# Patient Record
Sex: FEMALE | Race: WHITE | Employment: OTHER | URBAN - METROPOLITAN AREA
[De-identification: names, ages, dates, MRNs, and addresses within clinical notes are randomized per-mention and may not be internally consistent; named-entity substitution may affect disease eponyms.]

---

## 2017-01-25 PROBLEM — Z78.9 STATIN INTOLERANCE: Status: ACTIVE | Noted: 2017-01-25

## 2017-03-06 RX ORDER — METFORMIN HYDROCHLORIDE 500 MG/1
500 TABLET, EXTENDED RELEASE ORAL DAILY
Qty: 90 TAB | Refills: 1 | Status: SHIPPED | OUTPATIENT
Start: 2017-03-06 | End: 2017-07-31 | Stop reason: SDUPTHER

## 2017-03-06 RX ORDER — ALBUTEROL SULFATE 90 UG/1
2 AEROSOL, METERED RESPIRATORY (INHALATION)
Qty: 1 INHALER | Refills: 3 | Status: SHIPPED | OUTPATIENT
Start: 2017-03-06 | End: 2017-06-16 | Stop reason: SDDI

## 2017-04-05 RX ORDER — IBUPROFEN 800 MG/1
800 TABLET ORAL
Qty: 90 TAB | Refills: 3 | Status: SHIPPED | OUTPATIENT
Start: 2017-04-05 | End: 2017-06-16 | Stop reason: SDDI

## 2017-06-07 DIAGNOSIS — E55.9 VITAMIN D DEFICIENCY: ICD-10-CM

## 2017-06-07 DIAGNOSIS — E78.2 MIXED HYPERCHOLESTEROLEMIA AND HYPERTRIGLYCERIDEMIA: ICD-10-CM

## 2017-06-07 DIAGNOSIS — E11.9 CONTROLLED TYPE 2 DIABETES MELLITUS WITHOUT COMPLICATION, WITHOUT LONG-TERM CURRENT USE OF INSULIN (HCC): Primary | ICD-10-CM

## 2017-06-09 PROBLEM — E11.29 TYPE 2 DIABETES MELLITUS WITH MICROALBUMINURIA, WITHOUT LONG-TERM CURRENT USE OF INSULIN (HCC): Status: ACTIVE | Noted: 2017-06-09

## 2017-06-09 PROBLEM — R80.9 TYPE 2 DIABETES MELLITUS WITH MICROALBUMINURIA, WITHOUT LONG-TERM CURRENT USE OF INSULIN (HCC): Status: ACTIVE | Noted: 2017-06-09

## 2017-06-09 LAB
25(OH)D3 SERPL-MCNC: 18.5 NG/ML (ref 32–100)
A-G RATIO,AGRAT: 1.5 RATIO (ref 1.1–2.6)
ALBUMIN SERPL-MCNC: 4.3 G/DL (ref 3.5–5)
ALP SERPL-CCNC: 106 U/L (ref 25–115)
ALT SERPL-CCNC: 37 U/L (ref 5–40)
ANION GAP SERPL CALC-SCNC: 18 MMOL/L
AST SERPL W P-5'-P-CCNC: 35 U/L (ref 10–37)
AVG GLU, 10930: 144 MG/DL (ref 91–123)
BILIRUB SERPL-MCNC: <0.1 MG/DL (ref 0.2–1.2)
BUN SERPL-MCNC: 8 MG/DL (ref 6–22)
CALCIUM SERPL-MCNC: 9.3 MG/DL (ref 8.4–10.5)
CHLORIDE SERPL-SCNC: 94 MMOL/L (ref 98–110)
CHOLEST SERPL-MCNC: 181 MG/DL (ref 110–200)
CO2 SERPL-SCNC: 25 MMOL/L (ref 20–32)
CREAT SERPL-MCNC: 0.6 MG/DL (ref 0.5–1.2)
CREATININE, URINE: 41 MG/DL
ERYTHROCYTE [DISTWIDTH] IN BLOOD BY AUTOMATED COUNT: 15.5 % (ref 10–16)
GFRAA, 66117: >60
GFRNA, 66118: >60
GLOBULIN,GLOB: 2.8 G/DL (ref 2–4)
GLUCOSE SERPL-MCNC: 134 MG/DL (ref 65–99)
HBA1C MFR BLD HPLC: 6.7 % (ref 4.8–5.9)
HCT VFR BLD AUTO: 49.4 % (ref 35.1–48)
HDLC SERPL-MCNC: 30 MG/DL (ref 40–59)
HGB BLD-MCNC: 15.1 G/DL (ref 11.7–16)
LDLC SERPL CALC-MCNC: 84 MG/DL (ref 50–99)
MCH RBC QN AUTO: 28 PG (ref 26–34)
MCHC RBC AUTO-ENTMCNC: 31 G/DL (ref 32–36)
MCV RBC AUTO: 92 FL (ref 80–95)
MICROALB/CREAT RATIO, 140286: 37.1 MCG/MG OF CREATININE (ref 0–30)
MICROALBUMIN,URINE RANDOM 140054: 15.2 UG/ML (ref 0.1–17)
PLATELET # BLD AUTO: 282 K/UL (ref 140–440)
PMV BLD AUTO: 11.3 FL (ref 6–10.8)
POTASSIUM SERPL-SCNC: 4.4 MMOL/L (ref 3.5–5.5)
PROT SERPL-MCNC: 7.1 G/DL (ref 6.4–8.3)
RBC # BLD AUTO: 5.37 M/UL (ref 3.8–5.2)
SODIUM SERPL-SCNC: 137 MMOL/L (ref 133–145)
TRIGL SERPL-MCNC: 333 MG/DL (ref 40–149)
VLDLC SERPL CALC-MCNC: 67 MG/DL (ref 8–30)
WBC # BLD AUTO: 9.6 K/UL (ref 4–11)

## 2017-06-16 ENCOUNTER — OFFICE VISIT (OUTPATIENT)
Dept: FAMILY MEDICINE CLINIC | Age: 57
End: 2017-06-16

## 2017-06-16 VITALS
HEART RATE: 82 BPM | RESPIRATION RATE: 22 BRPM | DIASTOLIC BLOOD PRESSURE: 88 MMHG | OXYGEN SATURATION: 97 % | SYSTOLIC BLOOD PRESSURE: 132 MMHG | HEIGHT: 64 IN | TEMPERATURE: 97.9 F | BODY MASS INDEX: 40.46 KG/M2 | WEIGHT: 237 LBS

## 2017-06-16 DIAGNOSIS — R80.9 TYPE 2 DIABETES MELLITUS WITH MICROALBUMINURIA, WITHOUT LONG-TERM CURRENT USE OF INSULIN (HCC): Primary | ICD-10-CM

## 2017-06-16 DIAGNOSIS — E78.2 MIXED HYPERCHOLESTEROLEMIA AND HYPERTRIGLYCERIDEMIA: ICD-10-CM

## 2017-06-16 DIAGNOSIS — E11.29 TYPE 2 DIABETES MELLITUS WITH MICROALBUMINURIA, WITHOUT LONG-TERM CURRENT USE OF INSULIN (HCC): Primary | ICD-10-CM

## 2017-06-16 DIAGNOSIS — Z78.0 POSTMENOPAUSAL: ICD-10-CM

## 2017-06-16 DIAGNOSIS — M85.80 OSTEOPENIA, UNSPECIFIED LOCATION: ICD-10-CM

## 2017-06-16 DIAGNOSIS — Z12.39 SCREENING FOR BREAST CANCER: ICD-10-CM

## 2017-06-16 DIAGNOSIS — Z12.11 SCREEN FOR COLON CANCER: ICD-10-CM

## 2017-06-16 NOTE — PROGRESS NOTES
cAacia Maldonado is a 61 y.o. female  Patient here for follow up for diabetes. 1. Have you been to the ER, urgent care clinic since your last visit? Hospitalized since your last visit? No    2. Have you seen or consulted any other health care providers outside of the [address] [place] [address] since your last visit? Include any pap smears or colon screening.  Yes Where: [facility]

## 2017-06-16 NOTE — PROGRESS NOTES
Assessment/Plan:    1. Type 2 diabetes mellitus with microalbuminuria, without long-term current use of insulin (HCC)  -A1c 6.7. F/u in 6 mos  -  DIABETES FOOT EXAM    2. Mixed hypercholesterolemia and hypertriglyceridemia  -intol of statin  Lipid lowering therapy not prescibed for patient reasons. 3. Osteopenia, unspecified location  -pt to restart Calcium +D  - DEXA BONE DENSITY STUDY AXIAL; Future    4. Screening for breast cancer  - [facility] MAMMO BI SCREENING INCL CAD; Future    5. Screen for colon cancer  - OCCULT BLOOD, STOOL; Future    The plan was discussed with the patient. The patient verbalized understanding and is in agreement with the plan. All medication potential side effects were discussed with the patient. Health Maintenance:   Health Maintenance   Topic Date Due    Hepatitis C Screening  1960    Pneumococcal 19-64 Medium Risk (1 of 1 - PPSV23) 01/16/1978    BREAST CANCER SCRN MAMMOGRAM  09/26/2016    COLON CANCER SCRN (BARIUM / CT COLO / FLX SIG) Q5  01/01/2017    FOOT EXAM Q1  06/07/2017    INFLUENZA AGE 9 TO ADULT  08/01/2017    EYE EXAM RETINAL OR DILATED Q1  11/02/2017    HEMOGLOBIN A1C Q6M  12/08/2017    MICROALBUMIN Q1  06/08/2018    LIPID PANEL Q1  06/08/2018    DTaP/Tdap/Td series (2 - Td) 12/09/2026       Daja Ramey is a 61 y.o. female and presents with Diabetes     Subjective:  DM2-  On metformin. A1c 6.7. Osteopenia- stoped taking calcium and D. Vit D is low. ROS:  Constitutional: No recent weight change. No weakness/fatigue. No f/c. Skin: No rashes, change in nails/hair, itching   HENT: No HA, dizziness. No hearing loss/tinnitus. No nasal congestion/discharge. Eyes: No change in vision, double/blurred vision or eye pain/redness. Cardiovascular: No CP/palpitations. No PEREZ/orthopnea/PND. Respiratory: No cough/sputum, dyspnea, wheezing. Gastointestinal: No dysphagia, reflux. No n/v. No constipation/diarrhea.   No melena/rectal bleeding. Genitourinary: No dysuria, urinary hesitancy, nocturia, hematuria. No incontinence. Musculoskeletal: No joint pain/stiffness. No muscle pain/tenderness. Endo: No heat/cold intolerance, no polyuria/polydypsia. Heme: No h/o anemia. No easy bleeding/bruising. Allergy/Immunology: No seasonal rhinitis. Denies frequent colds, sinus/ear infections. Neurological: No seizures/numbness/weakness. No paresthesias. Psychiatric:  No depression, anxiety. The problem list was updated as a part of today's visit. Patient Active Problem List   Diagnosis Code    Obesity (BMI 30-39. 9) E66.9    Allergy-induced asthma J45.909    Tobacco use Z72.0    Vitamin D deficiency E55.9    Sciatic pain M54.30    Mixed hypercholesterolemia and hypertriglyceridemia E78.2    Seasonal affective disorder (HCC) F39    Osteopenia M85.80    Statin intolerance Z78.9    Type 2 diabetes mellitus with microalbuminuria, without long-term current use of insulin (HCC) E11.29, R80.9       The PSH, FH were reviewed. SH:  Social History   Substance Use Topics    Smoking status: Heavy Tobacco Smoker     Packs/day: 1.00    Smokeless tobacco: Never Used    Alcohol use No       Medications/Allergies:  Current Outpatient Prescriptions on File Prior to Visit   Medication Sig Dispense Refill    metFORMIN ER (GLUCOPHAGE XR) 500 mg tablet Take 1 Tab by mouth daily. Indications: type 2 diabetes mellitus 90 Tab 1    lisinopril (PRINIVIL, ZESTRIL) 5 mg tablet TAKE ONE TABLET BY MOUTH ONCE DAILY 90 Tab 1    calcium-cholecalciferol, d3, (CALCIUM+D) 400-133.3 mg-unit tab Take  by mouth.  alendronate (FOSAMAX) 70 mg tablet Take 1 Tab by mouth every Sunday. 12 Tab 4     No current facility-administered medications on file prior to visit.          Allergies   Allergen Reactions    Keflex [Cephalexin] Hives    Percocet [Oxycodone-Acetaminophen] Hives       Objective:  Visit Vitals    /88    Pulse 82    Temp 97.9 °F (36.6 °C) (Oral)    Resp 22    Ht 5' 4\" (1.626 m)    Wt 237 lb (107.5 kg)    SpO2 97%    BMI 40.68 kg/m2      Constitutional: Well developed, nourished, no distress, alert, obese habitus   CV: S1, S2.  RRR. No murmurs/rubs. No thrills palpated. No carotid bruits. Intact distal pulses. No edema. Pulm: No abnormalities on inspection. Clear to auscultation bilaterally. No wheezing/rhonchi. Normal effort. GI: Soft, nontender, nondistended. Normal active bowel sounds. Diabetic foot exam:     bilat:   Filament test normal sensation with micro filament     Deformities: Yes - bunion      Labwork and Ancillary Studies:    CBC w/Diff  Lab Results   Component Value Date/Time    WBC 9.6 06/08/2017 07:32 AM    HGB 15.1 06/08/2017 07:32 AM    PLATELET 874 32/71/8961 07:32 AM         Basic Metabolic Profile/LFTs  Lab Results   Component Value Date/Time    Sodium 137 06/08/2017 07:32 AM    Potassium 4.4 06/08/2017 07:32 AM    Chloride 94 06/08/2017 07:32 AM    CO2 25 06/08/2017 07:32 AM    Anion gap 18.0 06/08/2017 07:32 AM    Glucose 134 06/08/2017 07:32 AM    BUN 8 06/08/2017 07:32 AM    Creatinine 0.6 06/08/2017 07:32 AM    BUN/Creatinine ratio 12 06/01/2016 07:25 AM    GFR est AA >60 06/01/2016 07:25 AM    GFR est non-AA >60 06/01/2016 07:25 AM    Calcium 9.3 06/08/2017 07:32 AM      Lab Results   Component Value Date/Time    ALT (SGPT) 37 06/08/2017 07:32 AM    AST (SGOT) 35 06/08/2017 07:32 AM    Alk.  phosphatase 106 06/08/2017 07:32 AM    Bilirubin, total <0.1 06/08/2017 07:32 AM       Cholesterol  Lab Results   Component Value Date/Time    Cholesterol, total 181 06/08/2017 07:32 AM    HDL Cholesterol 30 06/08/2017 07:32 AM    LDL, calculated 84 06/08/2017 07:32 AM    Triglyceride 333 06/08/2017 07:32 AM    CHOL/HDL Ratio 4.4 12/06/2016 07:48 AM

## 2017-06-16 NOTE — MR AVS SNAPSHOT
Visit Information Date & Time Provider Department Dept. Phone Encounter #  
 6/16/2017  7:30 AM Nini Pradhan [place] [phone] [id number] Upcoming Health Maintenance Date Due Hepatitis C Screening 1960 Pneumococcal 19-64 Medium Risk (1 of 1 - PPSV23) 1/16/1978 BREAST CANCER SCRN MAMMOGRAM 9/26/2016 COLON CANCER SCRN (BARIUM / CT COLO / FLX SIG) Q5 1/1/2017 INFLUENZA AGE 9 TO ADULT 8/1/2017 EYE EXAM RETINAL OR DILATED Q1 11/2/2017 HEMOGLOBIN A1C Q6M 12/8/2017 MICROALBUMIN Q1 6/8/2018 LIPID PANEL Q1 6/8/2018 FOOT EXAM Q1 6/16/2018 DTaP/Tdap/Td series (2 - Td) 12/9/2026 Allergies as of 6/16/2017  Review Complete On: 6/16/2017 By: Anup Fox LPN Severity Noted Reaction Type Reactions Keflex [Cephalexin]  03/31/2015    Hives Percocet [Oxycodone-acetaminophen]  03/31/2015    Hives Current Immunizations  Never Reviewed Name Date Influenza Vaccine 10/2/2015  8:39 AM  
 Influenza Vaccine (Quad) PF 12/9/2016 Tdap 12/9/2016 Not reviewed this visit You Were Diagnosed With   
  
 Codes Comments Type 2 diabetes mellitus with microalbuminuria, without long-term current use of insulin (HCC)    -  Primary ICD-10-CM: E11.29, R80.9 ICD-9-CM: 250.40, 791.0 Mixed hypercholesterolemia and hypertriglyceridemia     ICD-10-CM: E78.2 ICD-9-CM: 272.2 Osteopenia, unspecified location     ICD-10-CM: M85.80 ICD-9-CM: 733.90 Screening for breast cancer     ICD-10-CM: Z12.39 
ICD-9-CM: V76.10 Screen for colon cancer     ICD-10-CM: Z12.11 ICD-9-CM: V76.51 Postmenopausal     ICD-10-CM: Z78.0 ICD-9-CM: V49.81 Vitals BP Pulse Temp Resp Height(growth percentile) Weight(growth percentile) 132/88 82 97.9 °F (36.6 °C) (Oral) 22 5' 4\" (1.626 m) 237 lb (107.5 kg) SpO2 BMI OB Status Smoking Status 97% 40.68 kg/m2 Hysterectomy Heavy Tobacco Smoker BMI and BSA Data Body Mass Index Body Surface Area  
 40.68 kg/m 2 2.2 m 2 Preferred Pharmacy Pharmacy Name Phone [facility] [address] [phone] Your Updated Medication List  
  
   
This list is accurate as of: 6/16/17  7:57 AM.  Always use your most recent med list.  
  
  
  
  
 CALCIUM+D 400-133.3 mg-unit Tab Generic drug:  calcium-cholecalciferol (d3) Take  by mouth.  
  
 metFORMIN  mg tablet Commonly known as:  GLUCOPHAGE XR Take 1 Tab by mouth daily. Indications: type 2 diabetes mellitus We Performed the Following  DIABETES FOOT EXAM [7 Custom] To-Do List   
 06/16/2017 Imaging:  DEXA BONE DENSITY STUDY AXIAL   
  
 06/16/2017 Imaging:  DEXA BONE DENSITY STUDY AXIAL   
  
 06/16/2017 Imaging:  [facility] MAMMO BI SCREENING INCL CAD   
  
 06/16/2017 Lab:  OCCULT BLOOD, STOOL Referral Information Referral ID Referred By Referred To  
  
 [id number] Ardena Fleischer V Not Available Visits Status Start Date End Date 1 New Request 6/16/17 6/16/18 If your referral has a status of pending review or denied, additional information will be sent to support the outcome of this decision. Referral ID Referred By Referred To  
 [id number] Ardena Fleischer V Not Available Visits Status Start Date End Date 1 New Request 6/16/17 6/16/18 If your referral has a status of pending review or denied, additional information will be sent to support the outcome of this decision. Introducing [facility]! Dear Lamont Aguilar: Thank you for requesting a [employer] account. Our records indicate that you already have an active [employer] account. You can access your account anytime at [email or web address] Face [email or web address] Face [email or web address] Did you know that you can access your hospital and ER discharge instructions at any time in [employer]?   You can also review all of your test results from your hospital stay or ER visit. Additional Information If you have questions, please visit the Frequently Asked Questions section of the [employer] website at [email or web address] Remember, [employer] is NOT to be used for urgent needs. For medical emergencies, dial 911. Now available from your iPhone and Android! Please provide this summary of care documentation to your next provider. Your primary care clinician is listed as Blayne Brewster. If you have any questions after today's visit, please call [phone].

## 2017-07-31 RX ORDER — LISINOPRIL 5 MG/1
TABLET ORAL
Qty: 90 TAB | Refills: 1 | Status: SHIPPED | OUTPATIENT
Start: 2017-07-31 | End: 2018-02-06

## 2017-07-31 RX ORDER — METFORMIN HYDROCHLORIDE 500 MG/1
500 TABLET, EXTENDED RELEASE ORAL DAILY
Qty: 90 TAB | Refills: 1 | Status: SHIPPED | OUTPATIENT
Start: 2017-07-31 | End: 2018-05-18 | Stop reason: SDUPTHER

## 2017-08-31 ENCOUNTER — OFFICE VISIT (OUTPATIENT)
Dept: FAMILY MEDICINE CLINIC | Age: 57
End: 2017-08-31

## 2017-08-31 VITALS
SYSTOLIC BLOOD PRESSURE: 130 MMHG | WEIGHT: 227.4 LBS | DIASTOLIC BLOOD PRESSURE: 80 MMHG | HEART RATE: 88 BPM | BODY MASS INDEX: 38.82 KG/M2 | OXYGEN SATURATION: 95 % | RESPIRATION RATE: 17 BRPM | HEIGHT: 64 IN | TEMPERATURE: 97.1 F

## 2017-08-31 DIAGNOSIS — J40 BRONCHITIS: Primary | ICD-10-CM

## 2017-08-31 DIAGNOSIS — J01.00 ACUTE NON-RECURRENT MAXILLARY SINUSITIS: ICD-10-CM

## 2017-08-31 RX ORDER — ALBUTEROL SULFATE 90 UG/1
2 AEROSOL, METERED RESPIRATORY (INHALATION)
COMMUNITY
Start: 2014-02-25 | End: 2017-08-31 | Stop reason: SDUPTHER

## 2017-08-31 RX ORDER — ALBUTEROL SULFATE 90 UG/1
2 AEROSOL, METERED RESPIRATORY (INHALATION)
Qty: 1 INHALER | Refills: 1 | Status: SHIPPED | OUTPATIENT
Start: 2017-08-31 | End: 2018-02-23 | Stop reason: SDUPTHER

## 2017-08-31 RX ORDER — CLARITHROMYCIN 500 MG/1
500 TABLET, FILM COATED ORAL 2 TIMES DAILY
Qty: 20 TAB | Refills: 0 | Status: SHIPPED | OUTPATIENT
Start: 2017-08-31 | End: 2017-09-10

## 2017-08-31 RX ORDER — HYDROCODONE BITARTRATE AND ACETAMINOPHEN 5; 300 MG/1; MG/1
1 TABLET ORAL
Qty: 7 TAB | Refills: 0 | Status: SHIPPED | OUTPATIENT
Start: 2017-08-31 | End: 2017-09-07

## 2017-08-31 RX ORDER — PREDNISONE 10 MG/1
10 TABLET ORAL 3 TIMES DAILY
Qty: 21 TAB | Refills: 0 | Status: SHIPPED | OUTPATIENT
Start: 2017-08-31 | End: 2017-09-07

## 2017-08-31 NOTE — PATIENT INSTRUCTIONS
Bronchitis: Care Instructions  Your Care Instructions    Bronchitis is inflammation of the bronchial tubes, which carry air to the lungs. The tubes swell and produce mucus, or phlegm. The mucus and inflamed bronchial tubes make you cough. You may have trouble breathing. Most cases of bronchitis are caused by viruses like those that cause colds. Antibiotics usually do not help and they may be harmful. Bronchitis usually develops rapidly and lasts about 2 to 3 weeks in otherwise healthy people. Follow-up care is a key part of your treatment and safety. Be sure to make and go to all appointments, and call your doctor if you are having problems. It's also a good idea to know your test results and keep a list of the medicines you take. How can you care for yourself at home? · Take all medicines exactly as prescribed. Call your doctor if you think you are having a problem with your medicine. · Get some extra rest.  · Take an over-the-counter pain medicine, such as acetaminophen (Tylenol), ibuprofen (Advil, Motrin), or naproxen (Aleve) to reduce fever and relieve body aches. Read and follow all instructions on the label. · Do not take two or more pain medicines at the same time unless the doctor told you to. Many pain medicines have acetaminophen, which is Tylenol. Too much acetaminophen (Tylenol) can be harmful. · Take an over-the-counter cough medicine that contains dextromethorphan to help quiet a dry, hacking cough so that you can sleep. Avoid cough medicines that have more than one active ingredient. Read and follow all instructions on the label. · Breathe moist air from a humidifier, hot shower, or sink filled with hot water. The heat and moisture will thin mucus so you can cough it out. · Do not smoke. Smoking can make bronchitis worse. If you need help quitting, talk to your doctor about stop-smoking programs and medicines. These can increase your chances of quitting for good.   When should you call for help? Call 911 anytime you think you may need emergency care. For example, call if:  · You have severe trouble breathing. Call your doctor now or seek immediate medical care if:  · You have new or worse trouble breathing. · You cough up dark brown or bloody mucus (sputum). · You have a new or higher fever. · You have a new rash. Watch closely for changes in your health, and be sure to contact your doctor if:  · You cough more deeply or more often, especially if you notice more mucus or a change in the color of your mucus. · You are not getting better as expected. Where can you learn more? Go to http://malick-elizabeth.info/. Enter H333 in the search box to learn more about \"Bronchitis: Care Instructions. \"  Current as of: March 25, 2017  Content Version: 11.3  © 8180-1097 [employer]. Care instructions adapted under license by [employer] (which disclaims liability or warranty for this information). If you have questions about a medical condition or this instruction, always ask your healthcare professional. [place] [address] any warranty or liability for your use of this information. Sinusitis: Care Instructions  Your Care Instructions    Sinusitis is an infection of the lining of the sinus cavities in your head. Sinusitis often follows a cold. It causes pain and pressure in your head and face. In most cases, sinusitis gets better on its own in 1 to 2 weeks. But some mild symptoms may last for several weeks. Sometimes antibiotics are needed. Follow-up care is a key part of your treatment and safety. Be sure to make and go to all appointments, and call your doctor if you are having problems. It's also a good idea to know your test results and keep a list of the medicines you take. How can you care for yourself at home? · Take an over-the-counter pain medicine, such as acetaminophen (Tylenol), ibuprofen (Advil, Motrin), or naproxen (Aleve). Read and follow all instructions on the label. · If the doctor prescribed antibiotics, take them as directed. Do not stop taking them just because you feel better. You need to take the full course of antibiotics. · Be careful when taking over-the-counter cold or flu medicines and Tylenol at the same time. Many of these medicines have acetaminophen, which is Tylenol. Read the labels to make sure that you are not taking more than the recommended dose. Too much acetaminophen (Tylenol) can be harmful. · Breathe warm, moist air from a steamy shower, a hot bath, or a sink filled with hot water. Avoid cold, dry air. Using a humidifier in your home may help. Follow the directions for cleaning the machine. · Use saline (saltwater) nasal washes to help keep your nasal passages open and wash out mucus and bacteria. You can buy saline nose drops at a grocery store or drugstore. Or you can make your own at home by adding 1 teaspoon of salt and 1 teaspoon of baking soda to 2 cups of distilled water. If you make your own, fill a bulb syringe with the solution, insert the tip into your nostril, and squeeze gently. Shedd Nicole your nose. · Put a hot, wet towel or a warm gel pack on your face 3 or 4 times a day for 5 to 10 minutes each time. · Try a decongestant nasal spray like oxymetazoline (Afrin). Do not use it for more than 3 days in a row. Using it for more than 3 days can make your congestion worse. When should you call for help? Call your doctor now or seek immediate medical care if:  · You have new or worse swelling or redness in your face or around your eyes. · You have a new or higher fever. Watch closely for changes in your health, and be sure to contact your doctor if:  · You have new or worse facial pain. · The mucus from your nose becomes thicker (like pus) or has new blood in it. · You are not getting better as expected. Where can you learn more? Go to http://malick-elizabeth.info/.   Enter E304 in the search box to learn more about \"Sinusitis: Care Instructions. \"  Current as of: July 29, 2016  Content Version: 11.3  © 5448-6613 [employer]. Care instructions adapted under license by [employer] (which disclaims liability or warranty for this information). If you have questions about a medical condition or this instruction, always ask your healthcare professional. [place] [address] any warranty or liability for your use of this information.

## 2017-08-31 NOTE — PROGRESS NOTES
Dandre Ahn is a 61 y.o. female presents to office for congestion, ear pain, facial pressure and cough.       1. Have you been to the ER, urgent care clinic or hospitalized since your last visit? no          Health Maintenance items with a due date reviewed with patient:  Health Maintenance Due   Topic Date Due    Hepatitis C Screening  1960    Pneumococcal 19-64 Medium Risk (1 of 1 - PPSV23) 01/16/1978    BREAST CANCER SCRN MAMMOGRAM  09/26/2016    COLON CANCER SCRN (BARIUM / CT COLO / FLX SIG) Q5  01/01/2017    INFLUENZA AGE 9 TO ADULT  08/01/2017

## 2017-08-31 NOTE — PROGRESS NOTES
HISTORY OF PRESENT ILLNESS  Harshad Perez is a 61 y.o. female with a history of one week of a cough and four days of sinus congestion. The cough worsened three days ago as well as the sinus congestion worsening two days ago and has sinus pressure. Has taken sudafed and dayquil for the last several consecutive days which gives little or no relief. She is a smoker but states she has smoked very minimally during the last week. The cough is productive with light yellow sputum and nasal discharge is mildly thick and cloudy. Cough   The history is provided by the patient. This is a new problem. The current episode started more than 1 week ago. The problem occurs daily. The problem has been gradually worsening. Associated symptoms include headaches (frontal) and shortness of breath. Pertinent negatives include no chest pain. The symptoms are aggravated by exertion. She has tried rest and water (OTC medications) for the symptoms. The treatment provided mild relief. Sinus Pain    The history is provided by the patient. This is a new problem. The current episode started more than 2 days ago. The problem has been gradually worsening. There has been no fever. The pain is mild. Associated symptoms include congestion, ear pain, cough, shortness of breath and headaches (frontal). Pertinent negatives include no chills, no sore throat and no chest pain. She has tried decongestants for the symptoms. The treatment provided mild relief. Review of Systems   Constitutional: Negative for chills and fever. HENT: Positive for congestion, ear pain and sinus pain. Negative for sore throat. Respiratory: Positive for cough and shortness of breath. Cardiovascular: Negative for chest pain. Gastrointestinal: Negative for nausea. Neurological: Positive for headaches (frontal). Negative for dizziness.        Objective  Visit Vitals    /80 (BP 1 Location: Left arm, BP Patient Position: Sitting)    Pulse 88    Temp 97.1 °F (36.2 °C) (Oral)    Resp 17    Ht 5' 4\" (1.626 m)    Wt 227 lb 6.4 oz (103.1 kg)    SpO2 95%    BMI 39.03 kg/m2       Physical Exam   HENT:   Right Ear: External ear normal.   Left Ear: External ear normal.   Mouth/Throat: Oropharynx is clear and moist.   Cardiovascular: Normal rate and regular rhythm. No murmur heard. Pulmonary/Chest: Effort normal. She has wheezes (faint expiratory). Lymphadenopathy:     She has no cervical adenopathy. ASSESSMENT and PLAN    ICD-10-CM ICD-9-CM    1. Bronchitis J40 490 XR CHEST PA LAT   2. Acute non-recurrent maxillary sinusitis J01.00 461.0      Treated with prednisone for the chest tightness and sinus pressure. Pt states Vicodin helps the best with night time cough vs cough syrups so RX given for only one weeks worth. She agreed to hold off on the antibiotic since this very well may be viral and will only start the antibiotic if symptoms do not improve or worsen.

## 2017-08-31 NOTE — MR AVS SNAPSHOT
Visit Information Date & Time Provider Department Dept. Phone Encounter #  
 8/31/2017  1:45 PM Geovanna Altman, [facility] [phone] [id number] Follow-up Instructions Return if symptoms worsen or fail to improve. Your Appointments 12/15/2017  7:30 AM  
Follow Up with Dior Breaux MD  
[facility] [address]) Appt Note: 6 month f/u  
 [address] [facility] [id number] [phone]  
  
   
 [address] [facility] 280 [facility] Upcoming Health Maintenance Date Due Hepatitis C Screening 1960 Pneumococcal 19-64 Medium Risk (1 of 1 - PPSV23) 1/16/1978 BREAST CANCER SCRN MAMMOGRAM 9/26/2016 COLON CANCER SCRN (BARIUM / CT COLO / FLX SIG) Q5 1/1/2017 INFLUENZA AGE 9 TO ADULT 8/1/2017 EYE EXAM RETINAL OR DILATED Q1 11/2/2017 HEMOGLOBIN A1C Q6M 12/8/2017 MICROALBUMIN Q1 6/8/2018 LIPID PANEL Q1 6/8/2018 FOOT EXAM Q1 6/16/2018 DTaP/Tdap/Td series (2 - Td) 12/9/2026 Allergies as of 8/31/2017  Review Complete On: 8/31/2017 By: JESSICA Willis Severity Noted Reaction Type Reactions Keflex [Cephalexin]  03/31/2015    Hives Percocet [Oxycodone-acetaminophen]  03/31/2015    Hives Current Immunizations  Never Reviewed Name Date Influenza Vaccine 10/2/2015  8:39 AM  
 Influenza Vaccine (Quad) PF 12/9/2016 Tdap 12/9/2016 Not reviewed this visit You Were Diagnosed With   
  
 Codes Comments Bronchitis    -  Primary ICD-10-CM: C35 ICD-9-CM: 981 Acute non-recurrent maxillary sinusitis     ICD-10-CM: J01.00 ICD-9-CM: 461.0 Vitals BP Pulse Temp Resp Height(growth percentile) Weight(growth percentile) 130/80 (BP 1 Location: Left arm, BP Patient Position: Sitting) 88 97.1 °F (36.2 °C) (Oral) 17 5' 4\" (1.626 m) 227 lb 6.4 oz (103.1 kg) SpO2 BMI OB Status Smoking Status 95% 39.03 kg/m2 Hysterectomy Heavy Tobacco Smoker Vitals History BMI and BSA Data Body Mass Index Body Surface Area 39.03 kg/m 2 2.16 m 2 Preferred Pharmacy Pharmacy Name Phone [address] [facility] Floors [phone] Your Updated Medication List  
  
   
This list is accurate as of: 8/31/17  3:13 PM.  Always use your most recent med list.  
  
  
  
  
 albuterol 90 mcg/actuation inhaler Commonly known as:  PROVENTIL HFA, VENTOLIN HFA, PROAIR HFA Take 2 Puffs by inhalation every six (6) hours as needed for Wheezing. CALCIUM+D 400-133.3 mg-unit Tab Generic drug:  calcium-cholecalciferol (d3) Take  by mouth. clarithromycin 500 mg tablet Commonly known as:  Jose Danielle Take 1 Tab by mouth two (2) times a day for 10 days. HYDROcodone-acetaminophen 5-300 mg tablet Commonly known as:  Carlus Prior Take 1 Tab by mouth Daily (before dinner) for 7 days. Max Daily Amount: 1 Tab. lisinopril 5 mg tablet Commonly known as:  PRINIVIL, ZESTRIL  
TAKE ONE TABLET BY MOUTH ONCE DAILY  
  
 metFORMIN  mg tablet Commonly known as:  GLUCOPHAGE XR Take 1 Tab by mouth daily. Indications: type 2 diabetes mellitus  
  
 predniSONE 10 mg tablet Commonly known as:  Yakelin Thurman Take 1 Tab by mouth three (3) times daily for 7 days. Prescriptions Printed Refills  
 clarithromycin (BIAXIN) 500 mg tablet 0 Sig: Take 1 Tab by mouth two (2) times a day for 10 days. Class: Print Route: Oral  
 HYDROcodone-acetaminophen (XODOL) 5-300 mg tablet 0 Sig: Take 1 Tab by mouth Daily (before dinner) for 7 days. Max Daily Amount: 1 Tab. Class: Print Route: Oral  
  
Prescriptions Sent to Pharmacy Refills  
 albuterol (PROVENTIL HFA, VENTOLIN HFA, PROAIR HFA) 90 mcg/actuation inhaler 1 Sig: Take 2 Puffs by inhalation every six (6) hours as needed for Wheezing.   
 Class: Normal  
 Pharmacy: [address] Ph #: [phone] Route: Inhalation  
 predniSONE (DELTASONE) 10 mg tablet 0 Sig: Take 1 Tab by mouth three (3) times daily for 7 days. Class: Normal  
 Pharmacy: [address] Ph #: [phone] Route: Oral  
  
Follow-up Instructions Return if symptoms worsen or fail to improve. To-Do List   
 08/31/2017 Imaging:  XR CHEST PA LAT Patient Instructions Bronchitis: Care Instructions Your Care Instructions Bronchitis is inflammation of the bronchial tubes, which carry air to the lungs. The tubes swell and produce mucus, or phlegm. The mucus and inflamed bronchial tubes make you cough. You may have trouble breathing. Most cases of bronchitis are caused by viruses like those that cause colds. Antibiotics usually do not help and they may be harmful. Bronchitis usually develops rapidly and lasts about 2 to 3 weeks in otherwise healthy people. Follow-up care is a key part of your treatment and safety. Be sure to make and go to all appointments, and call your doctor if you are having problems. It's also a good idea to know your test results and keep a list of the medicines you take. How can you care for yourself at home? · Take all medicines exactly as prescribed. Call your doctor if you think you are having a problem with your medicine. · Get some extra rest. 
· Take an over-the-counter pain medicine, such as acetaminophen (Tylenol), ibuprofen (Advil, Motrin), or naproxen (Aleve) to reduce fever and relieve body aches. Read and follow all instructions on the label. · Do not take two or more pain medicines at the same time unless the doctor told you to. Many pain medicines have acetaminophen, which is Tylenol. Too much acetaminophen (Tylenol) can be harmful.  
· Take an over-the-counter cough medicine that contains dextromethorphan to help quiet a dry, hacking cough so that you can sleep. Avoid cough medicines that have more than one active ingredient. Read and follow all instructions on the label. · Breathe moist air from a humidifier, hot shower, or sink filled with hot water. The heat and moisture will thin mucus so you can cough it out. · Do not smoke. Smoking can make bronchitis worse. If you need help quitting, talk to your doctor about stop-smoking programs and medicines. These can increase your chances of quitting for good. When should you call for help? Call 911 anytime you think you may need emergency care. For example, call if: 
· You have severe trouble breathing. Call your doctor now or seek immediate medical care if: 
· You have new or worse trouble breathing. · You cough up dark brown or bloody mucus (sputum). · You have a new or higher fever. · You have a new rash. Watch closely for changes in your health, and be sure to contact your doctor if: 
· You cough more deeply or more often, especially if you notice more mucus or a change in the color of your mucus. · You are not getting better as expected. Where can you learn more? Go to http://malick-elizabeth.info/. Enter H333 in the search box to learn more about \"Bronchitis: Care Instructions. \" Current as of: March 25, 2017 Content Version: 11.3 © 6658-2140 [employer]. Care instructions adapted under license by [employer] (which disclaims liability or warranty for this information). If you have questions about a medical condition or this instruction, always ask your healthcare professional. [place] [address] any warranty or liability for your use of this information. Sinusitis: Care Instructions Your Care Instructions Sinusitis is an infection of the lining of the sinus cavities in your head. Sinusitis often follows a cold. It causes pain and pressure in your head and face. In most cases, sinusitis gets better on its own in 1 to 2 weeks. But some mild symptoms may last for several weeks. Sometimes antibiotics are needed. Follow-up care is a key part of your treatment and safety. Be sure to make and go to all appointments, and call your doctor if you are having problems. It's also a good idea to know your test results and keep a list of the medicines you take. How can you care for yourself at home? · Take an over-the-counter pain medicine, such as acetaminophen (Tylenol), ibuprofen (Advil, Motrin), or naproxen (Aleve). Read and follow all instructions on the label. · If the doctor prescribed antibiotics, take them as directed. Do not stop taking them just because you feel better. You need to take the full course of antibiotics. · Be careful when taking over-the-counter cold or flu medicines and Tylenol at the same time. Many of these medicines have acetaminophen, which is Tylenol. Read the labels to make sure that you are not taking more than the recommended dose. Too much acetaminophen (Tylenol) can be harmful. · Breathe warm, moist air from a steamy shower, a hot bath, or a sink filled with hot water. Avoid cold, dry air. Using a humidifier in your home may help. Follow the directions for cleaning the machine. · Use saline (saltwater) nasal washes to help keep your nasal passages open and wash out mucus and bacteria. You can buy saline nose drops at a grocery store or drugstore. Or you can make your own at home by adding 1 teaspoon of salt and 1 teaspoon of baking soda to 2 cups of distilled water. If you make your own, fill a bulb syringe with the solution, insert the tip into your nostril, and squeeze gently. Wanda Goad your nose. · Put a hot, wet towel or a warm gel pack on your face 3 or 4 times a day for 5 to 10 minutes each time. · Try a decongestant nasal spray like oxymetazoline (Afrin).  Do not use it for more than 3 days in a row. Using it for more than 3 days can make your congestion worse. When should you call for help? Call your doctor now or seek immediate medical care if: 
· You have new or worse swelling or redness in your face or around your eyes. · You have a new or higher fever. Watch closely for changes in your health, and be sure to contact your doctor if: 
· You have new or worse facial pain. · The mucus from your nose becomes thicker (like pus) or has new blood in it. · You are not getting better as expected. Where can you learn more? Go to http://malick-elizabeth.info/. Enter M393 in the search box to learn more about \"Sinusitis: Care Instructions. \" Current as of: July 29, 2016 Content Version: 11.3 © 2772-4972 [employer]. Care instructions adapted under license by [employer] (which disclaims liability or warranty for this information). If you have questions about a medical condition or this instruction, always ask your healthcare professional. [place] [address] any warranty or liability for your use of this information. Introducing [facility]! Dear Khurram Mann: Thank you for requesting a [employer] account. Our records indicate that you already have an active [employer] account. You can access your account anytime at [email or web address] Did you know that you can access your hospital and ER discharge instructions at any time in [employer]? You can also review all of your test results from your hospital stay or ER visit. Additional Information If you have questions, please visit the Frequently Asked Questions section of the [employer] website at [email or web address] Remember, [employer] is NOT to be used for urgent needs. For medical emergencies, dial 911. Now available from your iPhone and Android! Please provide this summary of care documentation to your next provider. Your primary care clinician is listed as Blayne Brewster. If you have any questions after today's visit, please call [phone].

## 2017-09-08 ENCOUNTER — TELEPHONE (OUTPATIENT)
Dept: FAMILY MEDICINE CLINIC | Age: 57
End: 2017-09-08

## 2017-09-08 DIAGNOSIS — R93.89 ABNORMAL CXR: Primary | ICD-10-CM

## 2017-09-08 NOTE — TELEPHONE ENCOUNTER
----- Message from Wilberto Alfaro sent at 9/8/2017 10:47 AM EDT -----  Please call pt to inform her that radiologist saw an area of slight increased density which could be a small area of infiltrate. Check to make sure she has improved. Advise her to f/u in 2 weeks for repeat xray.

## 2017-09-13 RX ORDER — ALBUTEROL SULFATE 0.83 MG/ML
2.5 SOLUTION RESPIRATORY (INHALATION)
Qty: 24 EACH | Refills: 0 | Status: SHIPPED | OUTPATIENT
Start: 2017-09-13 | End: 2018-09-05 | Stop reason: SDUPTHER

## 2017-09-19 ENCOUNTER — OFFICE VISIT (OUTPATIENT)
Dept: FAMILY MEDICINE CLINIC | Age: 57
End: 2017-09-19

## 2017-09-19 VITALS
HEART RATE: 82 BPM | TEMPERATURE: 98.5 F | DIASTOLIC BLOOD PRESSURE: 73 MMHG | HEIGHT: 64 IN | RESPIRATION RATE: 16 BRPM | SYSTOLIC BLOOD PRESSURE: 109 MMHG | BODY MASS INDEX: 38.76 KG/M2 | OXYGEN SATURATION: 98 % | WEIGHT: 227 LBS

## 2017-09-19 DIAGNOSIS — R05.9 COUGH: Primary | ICD-10-CM

## 2017-09-19 RX ORDER — BROMPHENIRAMINE MALEATE, PSEUDOEPHEDRINE HYDROCHLORIDE, AND DEXTROMETHORPHAN HYDROBROMIDE 2; 30; 10 MG/5ML; MG/5ML; MG/5ML
5 SYRUP ORAL
Qty: 1 BOTTLE | Refills: 0 | Status: SHIPPED | OUTPATIENT
Start: 2017-09-19 | End: 2017-09-26

## 2017-09-19 NOTE — PROGRESS NOTES
Nate Newman is a 61 y.o. female presents in office to be seen for abnormal xray. Health Maintenance Due   Topic Date Due    Hepatitis C Screening  1960    Pneumococcal 19-64 Medium Risk (1 of 1 - PPSV23) 01/16/1978    BREAST CANCER SCRN MAMMOGRAM  09/26/2016    COLON CANCER SCRN (BARIUM / CT COLO / FLX SIG) Q5  01/01/2017    INFLUENZA AGE 9 TO ADULT  08/01/2017       1. Have you been to the ER, urgent care clinic since your last visit? Hospitalized since your last visit?no    2. Have you seen or consulted any other health care providers outside of the [address] [place] [address] since your last visit? Include any pap smears or colon screening.  no

## 2017-10-06 ENCOUNTER — TELEPHONE (OUTPATIENT)
Dept: FAMILY MEDICINE CLINIC | Age: 57
End: 2017-10-06

## 2017-10-09 NOTE — TELEPHONE ENCOUNTER
Notes Recorded by JESSICA Tucker on 10/5/2017 at 1:20 PM  Please call pt to reassure her that the radiologist read xray as normal (no evidence of pneumonia or any other abnormality). Spoke with patient. Pt has further questions regarding the most recent xray being similar to the last one. Pt requests a call from provider.

## 2017-10-12 NOTE — PROGRESS NOTES
HISTORY OF PRESENT ILLNESS  Anna Coreas is a 61 y.o. female who was diagnosed with Bronchitis on last visit and the cxr reading was possible for a small infiltrate. She is still coughing but less than she was and denies any constitutional symptoms. Cough   The history is provided by the patient. The current episode started more than 1 week ago. The problem occurs daily. The problem has been gradually improving. Pertinent negatives include no chest pain, no headaches and no shortness of breath. The symptoms are aggravated by exertion. The symptoms are relieved by medications. She has tried rest for the symptoms. The treatment provided moderate relief. Visit Vitals    /73    Pulse 82    Temp 98.5 °F (36.9 °C) (Oral)    Resp 16    Ht 5' 4.02\" (1.626 m)    Wt 227 lb (103 kg)    SpO2 98%    BMI 38.94 kg/m2       Review of Systems   Constitutional: Negative for chills and fever. HENT: Negative. Respiratory: Positive for cough. Negative for shortness of breath. Cardiovascular: Negative for chest pain. Neurological: Negative for headaches. Physical Exam   Constitutional: She appears well-developed and well-nourished. No distress. HENT:   Right Ear: External ear normal.   Left Ear: External ear normal.   Mouth/Throat: Oropharynx is clear and moist.   Cardiovascular: Normal rate and regular rhythm. No murmur heard. Pulmonary/Chest: Effort normal and breath sounds normal. She has no wheezes. She has no rales. Lymphadenopathy:     She has no cervical adenopathy. ASSESSMENT and PLAN    ICD-10-CM ICD-9-CM    1. Cough R05 786.2 XR CHEST PA LAT     Chest xray appears normal so if there was an infiltrate it has resolved. She is given cough medicine to hopefully resolve the cough over the next few days. She is to return to center if cough worsens or she dev elopes any other symptoms. Patient verbalizes understanding and agrees with plan.

## 2018-02-06 ENCOUNTER — OFFICE VISIT (OUTPATIENT)
Dept: FAMILY MEDICINE CLINIC | Age: 58
End: 2018-02-06

## 2018-02-06 VITALS
DIASTOLIC BLOOD PRESSURE: 96 MMHG | BODY MASS INDEX: 39.34 KG/M2 | OXYGEN SATURATION: 95 % | TEMPERATURE: 97.5 F | SYSTOLIC BLOOD PRESSURE: 130 MMHG | WEIGHT: 230.4 LBS | HEIGHT: 64 IN | RESPIRATION RATE: 20 BRPM | HEART RATE: 83 BPM

## 2018-02-06 DIAGNOSIS — M25.552 PAIN IN JOINT OF LEFT HIP: ICD-10-CM

## 2018-02-06 DIAGNOSIS — F33.8 SEASONAL AFFECTIVE DISORDER (HCC): ICD-10-CM

## 2018-02-06 DIAGNOSIS — E11.21 TYPE 2 DIABETES MELLITUS WITH NEPHROPATHY (HCC): Primary | ICD-10-CM

## 2018-02-06 RX ORDER — ESCITALOPRAM OXALATE 10 MG/1
10 TABLET ORAL DAILY
Qty: 30 TAB | Refills: 0 | Status: SHIPPED | OUTPATIENT
Start: 2018-02-06 | End: 2018-03-08

## 2018-02-06 RX ORDER — MELOXICAM 7.5 MG/1
7.5 TABLET ORAL DAILY
Qty: 30 TAB | Refills: 0 | Status: SHIPPED | OUTPATIENT
Start: 2018-02-06 | End: 2018-02-20 | Stop reason: SDUPTHER

## 2018-02-06 NOTE — PROGRESS NOTES
Perla Sorensen is a 60 y.o. female (: 1960) presenting to address:    Chief Complaint   Patient presents with    Depression    Hip Pain     left hip    Hand Pain     left hand       Vitals:    18 0836 18 0857   BP: 133/79 (!) 130/96   Pulse: 83    Resp: 20    Temp: 97.5 °F (36.4 °C)    TempSrc: Oral    SpO2: 95%    Weight: 230 lb 6.4 oz (104.5 kg)    Height: 5' 4\" (1.626 m)    PainSc:   2    PainLoc: Hip        Hearing/Vision:   No exam data present    Learning Assessment:     Learning Assessment 3/31/2015   PRIMARY LEARNER Patient   HIGHEST LEVEL OF EDUCATION - PRIMARY LEARNER  2 YEARS OF COLLEGE   BARRIERS PRIMARY LEARNER NONE   CO-LEARNER CAREGIVER No   PRIMARY LANGUAGE ENGLISH   LEARNER PREFERENCE PRIMARY DEMONSTRATION   ANSWERED BY self   RELATIONSHIP SELF     Depression Screening:     PHQ over the last two weeks 2018   PHQ Not Done Active Diagnosis of Depression or Bipolar Disorder   Little interest or pleasure in doing things -   Feeling down, depressed or hopeless -   Total Score PHQ 2 -     Fall Risk Assessment:     Fall Risk Assessment, last 12 mths 2018   Able to walk? Yes   Fall in past 12 months? No     Abuse Screening:     Abuse Screening Questionnaire 2018   Do you ever feel afraid of your partner? N   Are you in a relationship with someone who physically or mentally threatens you? N   Is it safe for you to go home? Y     Coordination of Care Questionaire:   1. Have you been to the ER, urgent care clinic since your last visit? Hospitalized since your last visit? NO    2. Have you seen or consulted any other health care providers outside of the [address] [place] [address] since your last visit? Include any pap smears or colon screening.  NO

## 2018-02-07 LAB
A-G RATIO,AGRAT: 1.7 RATIO (ref 1.1–2.6)
ALBUMIN SERPL-MCNC: 4.4 G/DL (ref 3.5–5)
ALP SERPL-CCNC: 103 U/L (ref 25–115)
ALT SERPL-CCNC: 31 U/L (ref 5–40)
ANION GAP SERPL CALC-SCNC: 16 MMOL/L
AST SERPL W P-5'-P-CCNC: 30 U/L (ref 10–37)
AVG GLU, 10930: 149 MG/DL (ref 91–123)
BILIRUB SERPL-MCNC: 0.2 MG/DL (ref 0.2–1.2)
BUN SERPL-MCNC: 8 MG/DL (ref 6–22)
CALCIUM SERPL-MCNC: 9.3 MG/DL (ref 8.4–10.5)
CHLORIDE SERPL-SCNC: 99 MMOL/L (ref 98–110)
CHOLEST SERPL-MCNC: 179 MG/DL (ref 110–200)
CO2 SERPL-SCNC: 24 MMOL/L (ref 20–32)
CREAT SERPL-MCNC: 0.5 MG/DL (ref 0.5–1.2)
GFRAA, 66117: >60
GFRNA, 66118: >60
GLOBULIN,GLOB: 2.6 G/DL (ref 2–4)
GLUCOSE SERPL-MCNC: 136 MG/DL (ref 70–99)
HBA1C MFR BLD HPLC: 6.8 % (ref 4.8–5.9)
HDLC SERPL-MCNC: 4.5 MG/DL (ref 0–5)
HDLC SERPL-MCNC: 40 MG/DL (ref 40–59)
LDLC SERPL CALC-MCNC: 92 MG/DL (ref 50–99)
POTASSIUM SERPL-SCNC: 4.7 MMOL/L (ref 3.5–5.5)
PROT SERPL-MCNC: 7 G/DL (ref 6.4–8.3)
SODIUM SERPL-SCNC: 139 MMOL/L (ref 133–145)
TRIGL SERPL-MCNC: 236 MG/DL (ref 40–149)
VLDLC SERPL CALC-MCNC: 47 MG/DL (ref 8–30)

## 2018-02-12 NOTE — PROGRESS NOTES
HISTORY OF PRESENT ILLNESS  Brandy Mejia is a 60 y.o. female presenting with history of type 2 diabetes and seasonal affective disorder. The last time she had her labs drawn was over 6 months ago. She states she takes her medications daily as directed denies any side effects. She does not routinely check her blood sugar when she does it runs in the low 100s to mid 100s. She was diagnosed with seasonal affective disorder over 15 years ogo. She has not been on antidepressants for at least several months but started to have depressive symptoms couple of months ago on more days than not and over the last couple weeks it has been daily. She is less motivated to be social or do much outside of the house. She has negative thoughts but denies any suicidal or homicidal ideations. She has been on Lexapro in the past and remembers doing well on it without side effects. She has had left hip pain for the last couple months. Denies any injury to it. Hip pain is intermittent and mild at rest and moderately painful with movement when she any pain is present. Any radiation of pain or any weakness of the leg. Is been taking ibuprofen for which minimally helps and she is concerned about the length of time that she has been taking on a daily basis static is started to cause an ulcer. Depression   The history is provided by the patient. This is a chronic problem. The problem occurs daily. Pertinent negatives include no chest pain, no headaches and no shortness of breath. The symptoms are aggravated by stress. The symptoms are relieved by medications. Hip Pain    The history is provided by the patient. This is a new problem. The current episode started more than 1 week ago. The problem occurs daily. The problem has not changed since onset. The pain is present in the left hip. Associated symptoms include stiffness. Pertinent negatives include no numbness, full range of motion and no tingling.  She has tried OTC pain medications for the symptoms. The treatment provided mild relief. Diabetes   The history is provided by the patient. This is a chronic problem. The problem occurs daily. The problem has been gradually improving. Pertinent negatives include no chest pain, no headaches and no shortness of breath. The symptoms are relieved by medications. Visit Vitals    BP (!) 130/96 (BP 1 Location: Right arm, BP Patient Position: Sitting)  Comment: manual    Pulse 83    Temp 97.5 °F (36.4 °C) (Oral)    Resp 20    Ht 5' 4\" (1.626 m)    Wt 230 lb 6.4 oz (104.5 kg)    SpO2 95%    BMI 39.55 kg/m2       Social history: smoker      Review of Systems   Constitutional: Negative for chills, fever and malaise/fatigue. Eyes: Negative for blurred vision. Respiratory: Negative for shortness of breath. Cardiovascular: Negative for chest pain and palpitations. Genitourinary: Negative for frequency and urgency. Musculoskeletal: Positive for stiffness. Left hip pain   Neurological: Negative for tingling, tremors, sensory change, focal weakness, numbness and headaches. Endo/Heme/Allergies: Negative for polydipsia. Psychiatric/Behavioral: Positive for depression. Physical Exam   Constitutional: She is oriented to person, place, and time. No distress. Eyes: EOM are normal. Pupils are equal, round, and reactive to light. Neck: Neck supple. Cardiovascular: Normal rate, regular rhythm and normal heart sounds. No murmur heard. Pulmonary/Chest: Effort normal and breath sounds normal.   Musculoskeletal: Normal range of motion. She exhibits tenderness (mild lateral left hip tenderness). She exhibits no deformity. Hip without swelling    Lymphadenopathy:     She has no cervical adenopathy. Neurological: She is alert and oriented to person, place, and time. Distal sensation intact of extremities   Skin: No erythema. Psychiatric: She has a normal mood and affect.  Her behavior is normal. Thought content normal.       ASSESSMENT and PLAN    ICD-10-CM ICD-9-CM    1. Type 2 diabetes mellitus with nephropathy ([facility]) N94.25 526.25 METABOLIC PANEL, COMPREHENSIVE     583.81 LIPID PANEL      HEMOGLOBIN A1C WITH EAG      METABOLIC PANEL, COMPREHENSIVE      LIPID PANEL      HEMOGLOBIN A1C WITH EAG      HEMOGLOBIN A1C W/O EAG   2. Seasonal affective disorder ([facility]) F33.9 296.99 escitalopram oxalate (LEXAPRO) 10 mg tablet   3. Pain in joint of left hip M25.552 719.45 meloxicam (MOBIC) 7.5 mg tablet     Patient started Lexapro for seasonal affective disorder. She is instructed to return to center in 3-4 weeks for follow-up. Started on Mobic for the hip pain since this is most likely due to arthritis. Labs ordered and drawn for diabetes. He verbalized understanding and agreed with plan.

## 2018-02-20 ENCOUNTER — OFFICE VISIT (OUTPATIENT)
Dept: FAMILY MEDICINE CLINIC | Age: 58
End: 2018-02-20

## 2018-02-20 VITALS
HEART RATE: 76 BPM | WEIGHT: 232 LBS | TEMPERATURE: 98.4 F | RESPIRATION RATE: 18 BRPM | HEIGHT: 64 IN | SYSTOLIC BLOOD PRESSURE: 122 MMHG | OXYGEN SATURATION: 98 % | BODY MASS INDEX: 39.61 KG/M2 | DIASTOLIC BLOOD PRESSURE: 78 MMHG

## 2018-02-20 DIAGNOSIS — F33.8 SEASONAL AFFECTIVE DISORDER (HCC): ICD-10-CM

## 2018-02-20 DIAGNOSIS — M25.552 PAIN OF LEFT HIP JOINT: Primary | ICD-10-CM

## 2018-02-20 DIAGNOSIS — M25.552 PAIN IN JOINT OF LEFT HIP: ICD-10-CM

## 2018-02-20 RX ORDER — MELOXICAM 7.5 MG/1
15 TABLET ORAL DAILY
Qty: 60 TAB | Refills: 1 | Status: SHIPPED | OUTPATIENT
Start: 2018-02-20 | End: 2018-05-18 | Stop reason: SDUPTHER

## 2018-02-20 RX ORDER — TIZANIDINE 4 MG/1
TABLET ORAL
Qty: 14 TAB | Refills: 0 | Status: SHIPPED | OUTPATIENT
Start: 2018-02-20

## 2018-02-20 RX ORDER — ESCITALOPRAM OXALATE 10 MG/1
10 TABLET ORAL 2 TIMES DAILY
Qty: 60 TAB | Refills: 0 | Status: SHIPPED | OUTPATIENT
Start: 2018-02-20 | End: 2018-05-18 | Stop reason: SDUPTHER

## 2018-02-20 RX ORDER — LISINOPRIL 5 MG/1
5 TABLET ORAL DAILY
COMMUNITY
End: 2018-05-18 | Stop reason: SDUPTHER

## 2018-02-20 NOTE — TELEPHONE ENCOUNTER
Jeniffer Sparks,     Please see refill request. Per previous Bi[employer] message, patient increased to taking tablet 2 x daily or one 7.5 mg tab the night before a strenuous day at work. Requested Prescriptions     Pending Prescriptions Disp Refills    meloxicam (MOBIC) 7.5 mg tablet 60 Tab 1     Sig: Take 2 Tabs by mouth daily for 30 days.

## 2018-02-20 NOTE — PROGRESS NOTES
Perla Sorensen is a 60 y.o. female (: 1960) presenting to address:    Chief Complaint   Patient presents with    Hip Pain     Left hip pain shooting down leg to mid thigh / knee       Vitals:    18 1628   PainSc:   8   PainLoc: Hip       Hearing/Vision:   No exam data present    Learning Assessment:     Learning Assessment 3/31/2015   PRIMARY LEARNER Patient   HIGHEST LEVEL OF EDUCATION - PRIMARY LEARNER  2 YEARS OF COLLEGE   BARRIERS PRIMARY LEARNER NONE   CO-LEARNER CAREGIVER No   PRIMARY LANGUAGE ENGLISH   LEARNER PREFERENCE PRIMARY DEMONSTRATION   ANSWERED BY self   RELATIONSHIP SELF     Depression Screening:     PHQ over the last two weeks 2018   PHQ Not Done Active Diagnosis of Depression or Bipolar Disorder   Little interest or pleasure in doing things -   Feeling down, depressed or hopeless -   Total Score PHQ 2 -     Fall Risk Assessment:     Fall Risk Assessment, last 12 mths 2018   Able to walk? Yes   Fall in past 12 months? No     Abuse Screening:     Abuse Screening Questionnaire 2018   Do you ever feel afraid of your partner? N   Are you in a relationship with someone who physically or mentally threatens you? N   Is it safe for you to go home? Y     Coordination of Care Questionaire:   1. Have you been to the ER, urgent care clinic since your last visit? Hospitalized since your last visit? No  2. Have you seen or consulted any other health care providers outside of the [facility] since your last visit? Include any pap smears or colon screening. As documented    Advanced Directive:   1. Do you have an Advanced Directive? no  2. Would you like information on Advanced Directives?  No

## 2018-02-21 NOTE — PROGRESS NOTES
No action needed. Patient seen in office on 2.21 and labs were discussed. She had not been taking her metformin regularly when she had the labs done and since then she has been taking it daily as directed. She was instructed to return in 3 months for labs and she agreed with plan.

## 2018-03-05 NOTE — PROGRESS NOTES
HISTORY OF PRESENT ILLNESS  Perla Sorensen is a 60 y.o. female who presents for follow-up of left hip pain and depression. Her left hip pain has greatly improved since she has been taking the 50 mg of Mobic daily and there is either no pain present or only mild. A few times she has gotten a sudden moderately sharp pain and tension-like feeling in the hip that only lasts a matter of minutes and will then diminish over an hour's time. She is inquiring as to whether a muscle relaxer would help with that as well as it helping with the mild neck and upper back tension that she gets. She would like a refill on the Lexapro for her seasonal affective disorder. The Lexapro does very well to manage it and she denies any side effects. Hip Pain    The history is provided by the patient. The problem occurs rarely. The problem has been rapidly improving. The pain is present in the left hip. Pertinent negatives include no tingling. The symptoms are aggravated by activity. She has tried arthritis medications for the symptoms. The treatment provided significant relief. There has been no history of extremity trauma. Depression   The history is provided by the patient. This is a chronic problem. The problem occurs daily. Progression since onset: improves on medication. Pertinent negatives include no chest pain and no shortness of breath. The symptoms are aggravated by stress. The symptoms are relieved by medications. Visit Vitals    /78 (BP 1 Location: Right arm, BP Patient Position: Sitting)    Pulse 76    Temp 98.4 °F (36.9 °C) (Oral)    Resp 18    Ht 5' 4\" (1.626 m)    Wt 232 lb (105.2 kg)    SpO2 98%    BMI 39.82 kg/m2     Past Medical History:   Diagnosis Date    Asthma     Diabetes ([facility] [address]     Hypercholesterolemia        Review of Systems   Constitutional: Negative for malaise/fatigue. Respiratory: Negative for shortness of breath. Cardiovascular: Negative for chest pain and palpitations. Musculoskeletal:        Left hip pain   Neurological: Negative for dizziness, tingling, speech change and weakness. Psychiatric/Behavioral: Positive for depression. Physical Exam   Constitutional: She is oriented to person, place, and time. No distress. Eyes: EOM are normal. Pupils are equal, round, and reactive to light. Cardiovascular: Normal rate, regular rhythm and normal heart sounds. Pulmonary/Chest: Effort normal and breath sounds normal.   Musculoskeletal: Normal range of motion. She exhibits no tenderness or deformity. Lymphadenopathy:     She has no cervical adenopathy. Neurological: She is alert and oriented to person, place, and time. Psychiatric: She has a normal mood and affect. Her behavior is normal. Judgment and thought content normal.       ASSESSMENT and PLAN    ICD-10-CM ICD-9-CM    1. Pain of left hip joint M25.552 719.45 tiZANidine (ZANAFLEX) 4 mg tablet   2. Seasonal affective disorder (HCC) F33.9 296.99 escitalopram oxalate (LEXAPRO) 10 mg tablet     Medication refills given. She is given a prescription for Zanaflex and she states she has taken that in the past she is reminded about possible drowsy effect of it. Return to center and 2 months. Patient verbalized understanding and agreed with plan.

## 2018-05-11 ENCOUNTER — TELEPHONE (OUTPATIENT)
Dept: FAMILY MEDICINE CLINIC | Age: 58
End: 2018-05-11

## 2018-05-11 NOTE — TELEPHONE ENCOUNTER
Pt called and scheduled a f/u appt and is requesting to do lab work prior to her appointment on 5/18/18. Please call pt and advise if Labs can be ordered. Pt would like to come in 5/14/18 for labs.

## 2018-05-16 DIAGNOSIS — E11.9 CONTROLLED TYPE 2 DIABETES MELLITUS WITHOUT COMPLICATION, WITHOUT LONG-TERM CURRENT USE OF INSULIN (HCC): ICD-10-CM

## 2018-05-16 DIAGNOSIS — I10 ESSENTIAL HYPERTENSION: Primary | ICD-10-CM

## 2018-05-16 NOTE — TELEPHONE ENCOUNTER
Please call patient to inform her that I will gladly put an order for labs. If she would like to come to our morning, the results may be ready for her appointment on the 18th to discuss.

## 2018-05-18 ENCOUNTER — OFFICE VISIT (OUTPATIENT)
Dept: FAMILY MEDICINE CLINIC | Age: 58
End: 2018-05-18

## 2018-05-18 VITALS
HEART RATE: 87 BPM | DIASTOLIC BLOOD PRESSURE: 76 MMHG | TEMPERATURE: 96.9 F | BODY MASS INDEX: 38.64 KG/M2 | WEIGHT: 226.3 LBS | OXYGEN SATURATION: 95 % | HEIGHT: 64 IN | SYSTOLIC BLOOD PRESSURE: 118 MMHG | RESPIRATION RATE: 17 BRPM

## 2018-05-18 DIAGNOSIS — M25.552 PAIN IN JOINT OF LEFT HIP: ICD-10-CM

## 2018-05-18 DIAGNOSIS — F33.8 SEASONAL AFFECTIVE DISORDER (HCC): ICD-10-CM

## 2018-05-18 DIAGNOSIS — E11.9 CONTROLLED TYPE 2 DIABETES MELLITUS WITHOUT COMPLICATION, WITHOUT LONG-TERM CURRENT USE OF INSULIN (HCC): ICD-10-CM

## 2018-05-18 DIAGNOSIS — J45.909 UNCOMPLICATED ASTHMA, UNSPECIFIED ASTHMA SEVERITY, UNSPECIFIED WHETHER PERSISTENT: ICD-10-CM

## 2018-05-18 DIAGNOSIS — E78.2 MIXED HYPERCHOLESTEROLEMIA AND HYPERTRIGLYCERIDEMIA: Primary | ICD-10-CM

## 2018-05-18 PROBLEM — E66.01 SEVERE OBESITY (BMI 35.0-39.9) WITH COMORBIDITY (HCC): Status: ACTIVE | Noted: 2018-05-18

## 2018-05-18 LAB
A-G RATIO,AGRAT: 2 RATIO (ref 1.1–2.6)
ALBUMIN SERPL-MCNC: 4.4 G/DL (ref 3.5–5)
ALP SERPL-CCNC: 93 U/L (ref 25–115)
ALT SERPL-CCNC: 36 U/L (ref 5–40)
ANION GAP SERPL CALC-SCNC: 15 MMOL/L
AST SERPL W P-5'-P-CCNC: 34 U/L (ref 10–37)
AVG GLU, 10930: 138 MG/DL (ref 91–123)
BILIRUB SERPL-MCNC: 0.3 MG/DL (ref 0.2–1.2)
BUN SERPL-MCNC: 9 MG/DL (ref 6–22)
CALCIUM SERPL-MCNC: 9 MG/DL (ref 8.4–10.5)
CHLORIDE SERPL-SCNC: 95 MMOL/L (ref 98–110)
CHOLEST SERPL-MCNC: 187 MG/DL (ref 110–200)
CO2 SERPL-SCNC: 28 MMOL/L (ref 20–32)
CREAT SERPL-MCNC: 0.5 MG/DL (ref 0.5–1.2)
GFRAA, 66117: >60
GFRNA, 66118: >60
GLOBULIN,GLOB: 2.2 G/DL (ref 2–4)
GLUCOSE SERPL-MCNC: 135 MG/DL (ref 70–99)
HBA1C MFR BLD HPLC: 6.4 % (ref 4.8–5.9)
HDLC SERPL-MCNC: 32 MG/DL (ref 40–59)
HDLC SERPL-MCNC: 5.8 MG/DL (ref 0–5)
LDLC SERPL CALC-MCNC: 94 MG/DL (ref 50–99)
POTASSIUM SERPL-SCNC: 4.5 MMOL/L (ref 3.5–5.5)
PROT SERPL-MCNC: 6.6 G/DL (ref 6.4–8.3)
SODIUM SERPL-SCNC: 138 MMOL/L (ref 133–145)
TRIGL SERPL-MCNC: 303 MG/DL (ref 40–149)
VLDLC SERPL CALC-MCNC: 61 MG/DL (ref 8–30)

## 2018-05-18 RX ORDER — ESCITALOPRAM OXALATE 10 MG/1
10 TABLET ORAL DAILY
Qty: 30 TAB | Refills: 5 | Status: SHIPPED | OUTPATIENT
Start: 2018-05-18 | End: 2018-06-17

## 2018-05-18 RX ORDER — ALBUTEROL SULFATE 90 UG/1
2 AEROSOL, METERED RESPIRATORY (INHALATION)
Qty: 1 INHALER | Refills: 0 | Status: SHIPPED | OUTPATIENT
Start: 2018-05-18 | End: 2018-09-05 | Stop reason: SDUPTHER

## 2018-05-18 RX ORDER — LISINOPRIL 5 MG/1
5 TABLET ORAL DAILY
Qty: 90 TAB | Refills: 1 | Status: SHIPPED | OUTPATIENT
Start: 2018-05-18

## 2018-05-18 RX ORDER — MELOXICAM 7.5 MG/1
15 TABLET ORAL DAILY
Qty: 60 TAB | Refills: 5 | Status: SHIPPED | OUTPATIENT
Start: 2018-05-18 | End: 2018-06-17

## 2018-05-18 RX ORDER — METFORMIN HYDROCHLORIDE 500 MG/1
500 TABLET, EXTENDED RELEASE ORAL DAILY
Qty: 90 TAB | Refills: 1 | Status: SHIPPED | OUTPATIENT
Start: 2018-05-18

## 2018-06-04 ENCOUNTER — OFFICE VISIT (OUTPATIENT)
Dept: FAMILY MEDICINE CLINIC | Age: 58
End: 2018-06-04

## 2018-06-04 VITALS
DIASTOLIC BLOOD PRESSURE: 73 MMHG | HEART RATE: 76 BPM | SYSTOLIC BLOOD PRESSURE: 139 MMHG | RESPIRATION RATE: 22 BRPM | HEIGHT: 64 IN | TEMPERATURE: 97.3 F | BODY MASS INDEX: 38.93 KG/M2 | WEIGHT: 228 LBS | OXYGEN SATURATION: 93 %

## 2018-06-04 DIAGNOSIS — M25.552 LEFT HIP PAIN: Primary | ICD-10-CM

## 2018-06-04 DIAGNOSIS — M79.18 GLUTEAL PAIN: ICD-10-CM

## 2018-06-04 NOTE — MR AVS SNAPSHOT
[address] [place] [id number] 
[phone] Patient: Madelin Lazar MRN: [id number] ULS:[id number]/[id number] Visit Information Date & Time Provider Department Dept. Phone Encounter #  
 6/4/2018  1:15 PM 57 Keith Lynn MD [address] [place] [id number] Upcoming Health Maintenance Date Due Hepatitis C Screening 1960 Pneumococcal 19-64 Medium Risk (1 of 1 - PPSV23) 1/16/1978 BREAST CANCER SCRN MAMMOGRAM 9/26/2016 COLON CANCER SCRN (BARIUM / CT COLO / FLX SIG) Q5 1/1/2017 MICROALBUMIN Q1 6/8/2018 FOOT EXAM Q1 6/16/2018 Influenza Age 5 to Adult 8/1/2018 EYE EXAM RETINAL OR DILATED Q1 11/3/2018 HEMOGLOBIN A1C Q6M 11/17/2018 LIPID PANEL Q1 5/17/2019 DTaP/Tdap/Td series (2 - Td) 12/9/2026 Allergies as of 6/4/2018  Review Complete On: 2/20/2018 By: JESSICA Dominguez Severity Noted Reaction Type Reactions Animal Dander  12/23/2009    Other (comments) asthma Keflex [Cephalexin]  03/31/2015    Hives Mold Extracts  12/23/2009    Other (comments) asthma Other Plant, Animal, Environmental  12/28/2009    Other (comments)  
 blisters Percocet [Oxycodone-acetaminophen]  03/31/2015    Hives Pollen Extracts  12/23/2009    Other (comments) asthma Current Immunizations  Never Reviewed Name Date Influenza Vaccine 10/2/2015  8:39 AM  
 Influenza Vaccine (Quad) PF 12/9/2016 Tdap 12/9/2016 Not reviewed this visit You Were Diagnosed With   
  
 Codes Comments Left hip pain    -  Primary ICD-10-CM: D80.809 ICD-9-CM: 719.45 Gluteal pain     ICD-10-CM: M79.1 ICD-9-CM: 729.1 Vitals BP Pulse Temp Resp Height(growth percentile) Weight(growth percentile) 139/73 (BP 1 Location: Left arm, BP Patient Position: Sitting) 76 97.3 °F (36.3 °C) (Oral) 22 5' 4\" (1.626 m) 228 lb (103.4 kg) SpO2 BMI OB Status Smoking Status 93% 39.14 kg/m2 Hysterectomy Heavy Tobacco Smoker Vitals History BMI and BSA Data Body Mass Index Body Surface Area  
 39.14 kg/m 2 2.16 m 2 Preferred Pharmacy Pharmacy Name Phone [phone] Your Updated Medication List  
  
   
This list is accurate as of 6/4/18  2:18 PM.  Always use your most recent med list.  
  
  
  
  
 * albuterol 2.5 mg /3 mL (0.083 %) nebulizer solution Commonly known as:  PROVENTIL VENTOLIN  
3 mL by Nebulization route every four (4) hours as needed for Wheezing. * albuterol 90 mcg/actuation inhaler Commonly known as:  PROVENTIL HFA, VENTOLIN HFA, PROAIR HFA Take 2 Puffs by inhalation every six (6) hours as needed for Wheezing. CALCIUM+D 400-133.3 mg-unit Tab Generic drug:  calcium-cholecalciferol (d3) Take  by mouth.  
  
 escitalopram oxalate 10 mg tablet Commonly known as:  Hugo Beers Take 1 Tab by mouth daily for 30 days. lisinopril 5 mg tablet Commonly known as:  Helon Estrin Take 1 Tab by mouth daily. Indications: Diabetic Nephropathy, hypertension  
  
 meloxicam 7.5 mg tablet Commonly known as:  MOBIC Take 2 Tabs by mouth daily for 30 days. metFORMIN  mg tablet Commonly known as:  GLUCOPHAGE XR Take 1 Tab by mouth daily. Indications: type 2 diabetes mellitus  
  
 tiZANidine 4 mg tablet Commonly known as:  Camacho Husk One tab twice daily prn muscle tension * Notice: This list has 2 medication(s) that are the same as other medications prescribed for you. Read the directions carefully, and ask your doctor or other care provider to review them with you. We Performed the Following REFERRAL TO PHYSICAL THERAPY [FJW96 Custom] Comments:  
 Left hip pain and gluteal pain possible muscle sprain or spasm To-Do List   
 06/04/2018 Imaging:  XR HIP LT W OR WO PELV 2-3 VWS Referral Information Referral ID Referred By Referred To  
  
 [id number] [place] [facility] Not Available Visits Status Start Date End Date 1 New Request 6/4/18 6/4/19 If your referral has a status of pending review or denied, additional information will be sent to support the outcome of this decision. Introducing [facility]! Dear Rob Manrique: Thank you for requesting a [employer] account. Our records indicate that you already have an active [employer] account. You can access your account anytime at [email or web address] Did you know that you can access your hospital and ER discharge instructions at any time in [employer]? You can also review all of your test results from your hospital stay or ER visit. Additional Information If you have questions, please visit the Frequently Asked Questions section of the [employer] website at [email or web address] Remember, [employer] is NOT to be used for urgent needs. For medical emergencies, dial 911. Now available from your iPhone and Android! Please provide this summary of care documentation to your next provider. Your primary care clinician is listed as Hari Ward. If you have any questions after today's visit, please call [phone].

## 2018-06-04 NOTE — LETTER
6/4/2018 1:57 PM 
 
Ms. Judy Woodard [address] [facility] [id number] To Whom It May Concern, 
 
 
Ms. Sho Lacey is a current patient in our office. This is to certify that due to her health condition she cannot go camping. Please have the patient call our office if you have any further questions or concerns regarding this. Sincerely, Kathy Sanders MD

## 2018-06-04 NOTE — PROGRESS NOTES
Pina Slater     Chief Complaint   Patient presents with    Hip Pain     left hip     Vitals:    06/04/18 1308   BP: 139/73   Pulse: 76   Resp: 22   Temp: 97.3 °F (36.3 °C)   TempSrc: Oral   SpO2: 93%   Weight: 228 lb (103.4 kg)   Height: 5' 4\" (1.626 m)   PainSc:   5   PainLoc: Hip         HPI: Patient is here complaining about left hip pain she has been having this hip pain since last year she she received meloxicam 7.5 mg for the pain that helped her, but now she is having the pain and also having some gluteal area pain as well other than the hip joint. 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 She did have x-ray of her hip before so we will get an x-ray today. She does not recall any trauma or any intense movement of her hip  Past Medical History:   Diagnosis Date    Asthma     Diabetes ([address]     Hypercholesterolemia      Past Surgical History:   Procedure Laterality Date    HX BLADDER SUSPENSION      HX CHOLECYSTECTOMY      HX HERNIA REPAIR      incisional    HX HYSTERECTOMY      HX SVT ABLATION  2006    HX TONSILLECTOMY       Social History   Substance Use Topics    Smoking status: Heavy Tobacco Smoker     Packs/day: 1.00    Smokeless tobacco: Never Used    Alcohol use No       Family History   Problem Relation Age of Onset    Lung Disease Mother     Cancer Mother      lung    Alcohol abuse Father     Diabetes Father     Cancer Maternal Aunt 89     breast       Review of Systems   Constitutional: Negative for chills, fever, malaise/fatigue and weight loss. HENT: Negative for congestion, ear discharge, ear pain, hearing loss, nosebleeds, sinus pain and sore throat. Eyes: Negative for blurred vision, double vision and discharge. Respiratory: Negative for cough, hemoptysis, sputum production, shortness of breath and wheezing.     Cardiovascular: Negative for chest pain, palpitations, claudication and leg swelling. Gastrointestinal: Negative for abdominal pain, constipation, diarrhea, nausea and vomiting. Genitourinary: Negative for dysuria, frequency, hematuria and urgency. Musculoskeletal: Positive for joint pain and myalgias. Skin: Negative for itching and rash. Neurological: Negative for dizziness, tingling, sensory change, speech change, focal weakness, weakness and headaches. Psychiatric/Behavioral: Negative for depression, hallucinations, substance abuse and suicidal ideas. The patient is not nervous/anxious and does not have insomnia. Physical Exam   Constitutional: She is oriented to person, place, and time. She appears well-developed and well-nourished. No distress. HENT:   Head: Normocephalic and atraumatic. Mouth/Throat: Oropharynx is clear and moist. No oropharyngeal exudate. Eyes: Conjunctivae are normal. Pupils are equal, round, and reactive to light. Right eye exhibits no discharge. Left eye exhibits no discharge. No scleral icterus. Neck: Neck supple. No thyromegaly present. Cardiovascular: Normal rate, regular rhythm and normal heart sounds. No murmur heard. Pulmonary/Chest: Effort normal and breath sounds normal. No respiratory distress. She has no wheezes. She has no rales. She exhibits no tenderness. Abdominal: Soft. She exhibits no distension. There is no tenderness. There is no rebound. Musculoskeletal: Normal range of motion. She exhibits tenderness. She exhibits no edema or deformity. Left guteal  Area tenderness     Patient is able to bend and touch her toes and she is able to squat and get up with help    Lymphadenopathy:     She has no cervical adenopathy. Neurological: She is alert and oriented to person, place, and time. No cranial nerve deficit. Coordination normal.   Skin: Skin is warm and dry. No rash noted. She is not diaphoretic. No erythema. No pallor. Psychiatric: She has a normal mood and affect.  Her behavior is normal. Judgment and thought content normal.        Assessment and plan     1. Left hip pain  Possible hip arthritis will wait for x-ray report   \" XR HIP LT W OR WO PELV 2-3 VWS; Future  - REFERRAL TO PHYSICAL THERAPY    2. Gluteal pain  There is tenderness in the gluteal area possible piriformis muscle tightness or gluteal muscle tightness physical therapy evaluation    Plan of care was discussed with the patient and she verbalized understanding  - REFERRAL TO PHYSICAL THERAPY    Current Outpatient Prescriptions   Medication Sig Dispense Refill    lisinopril (PRINIVIL, ZESTRIL) 5 mg tablet Take 1 Tab by mouth daily. Indications: Diabetic Nephropathy, hypertension 90 Tab 1    metFORMIN ER (GLUCOPHAGE XR) 500 mg tablet Take 1 Tab by mouth daily. Indications: type 2 diabetes mellitus 90 Tab 1    meloxicam (MOBIC) 7.5 mg tablet Take 2 Tabs by mouth daily for 30 days. 60 Tab 5    escitalopram oxalate (LEXAPRO) 10 mg tablet Take 1 Tab by mouth daily for 30 days. 30 Tab 5    albuterol (PROVENTIL HFA, VENTOLIN HFA, PROAIR HFA) 90 mcg/actuation inhaler Take 2 Puffs by inhalation every six (6) hours as needed for Wheezing. 1 Inhaler 0    tiZANidine (ZANAFLEX) 4 mg tablet One tab twice daily prn muscle tension 14 Tab 0    albuterol (PROVENTIL VENTOLIN) 2.5 mg /3 mL (0.083 %) nebulizer solution 3 mL by Nebulization route every four (4) hours as needed for Wheezing. 24 Each 0    calcium-cholecalciferol, d3, (CALCIUM+D) 400-133.3 mg-unit tab Take  by mouth. Patient Active Problem List    Diagnosis Date Noted    Severe obesity (BMI 35.0-39. 9) with comorbidity ([facility] [address] 05/18/2018    Type 2 diabetes mellitus with nephropathy ([facility] [address] 02/06/2018    Type 2 diabetes mellitus with microalbuminuria, without long-term current use of insulin ([facility] [address] 06/09/2017    Statin intolerance 01/25/2017    Osteopenia 06/07/2016    Seasonal affective disorder ([facility] [address] 05/13/2015    Obesity (BMI 30-39.9) 03/31/2015    Allergy-induced asthma 03/31/2015    Tobacco use 03/31/2015    Vitamin D deficiency 03/31/2015    Sciatic pain 03/31/2015    Mixed hypercholesterolemia and hypertriglyceridemia 03/31/2015     Results for orders placed or performed in visit on 05/16/18   LIPID PANEL   Result Value Ref Range    Triglyceride 303 (H) 40 - 149 mg/dL    HDL Cholesterol 32 (L) 40 - 59 mg/dL    Cholesterol, total 187 110 - 200 mg/dL    CHOLESTEROL/HDL 5.8 0.0 - 5.0    LDL, calculated 94 50 - 99 mg/dL    VLDL, calculated 61 (H) 8 - 30 mg/dL   METABOLIC PANEL, COMPREHENSIVE   Result Value Ref Range    Glucose 135 (H) 70 - 99 mg/dL    BUN 9 6 - 22 mg/dL    Creatinine 0.5 0.5 - 1.2 mg/dL    Sodium 138 133 - 145 mmol/L    Potassium 4.5 3.5 - 5.5 mmol/L    Chloride 95 (L) 98 - 110 mmol/L    CO2 28 20 - 32 mmol/L    AST (SGOT) 34 10 - 37 U/L    ALT (SGPT) 36 5 - 40 U/L    Alk. phosphatase 93 25 - 115 U/L    Bilirubin, total 0.3 0.2 - 1.2 mg/dL    Calcium 9.0 8.4 - 10.5 mg/dL    Protein, total 6.6 6.4 - 8.3 g/dL    Albumin 4.4 3.5 - 5.0 g/dL    A-G Ratio 2.0 1.1 - 2.6 ratio    Globulin 2.2 2.0 - 4.0 g/dL    Anion gap 15.0 mmol/L    GFRAA >60.0 >60.0    GFRNA >60.0 >60.0   HEMOGLOBIN A1C W/O EAG   Result Value Ref Range    Hemoglobin A1c 6.4 (H) 4.8 - 5.9 %    AVG  (H) 91 - 123 mg/dL     Orders Only on 05/16/2018   Component Date Value Ref Range Status    Triglyceride 05/17/2018 303* 40 - 149 mg/dL Final    HDL Cholesterol 05/17/2018 32* 40 - 59 mg/dL Final    Cholesterol, total 05/17/2018 187  110 - 200 mg/dL Final    CHOLESTEROL/HDL 05/17/2018 5.8  0.0 - 5.0 Final    LDL, calculated 05/17/2018 94  50 - 99 mg/dL Final    VLDL, calculated 05/17/2018 61* 8 - 30 mg/dL Final    Comment: Test includes cholesterol, HDL cholesterol, triglycerides and LDL.   Cholesterol Recommended NCEP guidelines in mg/dL:  Less than 200            Desirable  200 - 239                Borderline High  Greater than or  = 240   High  Please Note:  Total Chol/HDL Ratio                   Men Women  1/2 Avg. Risk    3.4     3.3      Avg. Risk    5.0     4.4  2X  Avg. Risk    9.6     7.1  3X  Avg. Risk   23.4    11.0      Glucose 05/17/2018 135* 70 - 99 mg/dL Final    BUN 05/17/2018 9  6 - 22 mg/dL Final    Creatinine 05/17/2018 0.5  0.5 - 1.2 mg/dL Final    Sodium 05/17/2018 138  133 - 145 mmol/L Final    Potassium 05/17/2018 4.5  3.5 - 5.5 mmol/L Final    Chloride 05/17/2018 95* 98 - 110 mmol/L Final    CO2 05/17/2018 28  20 - 32 mmol/L Final    AST (SGOT) 05/17/2018 34  10 - 37 U/L Final    ALT (SGPT) 05/17/2018 36  5 - 40 U/L Final    Alk. phosphatase 05/17/2018 93  25 - 115 U/L Final    Bilirubin, total 05/17/2018 0.3  0.2 - 1.2 mg/dL Final    Calcium 05/17/2018 9.0  8.4 - 10.5 mg/dL Final    Protein, total 05/17/2018 6.6  6.4 - 8.3 g/dL Final    Albumin 05/17/2018 4.4  3.5 - 5.0 g/dL Final    A-G Ratio 05/17/2018 2.0  1.1 - 2.6 ratio Final    Globulin 05/17/2018 2.2  2.0 - 4.0 g/dL Final    Anion gap 05/17/2018 15.0  mmol/L Final    Comment: Test includes Albumin, Alkaline Phosphatase, ALT, AST, BUN, Calcium, CO2,  Chloride, Creatinine, Glucose, Potassium, Sodium, Total Bilirubin and Total  Protein. Estimated GFR results are reported in mL/min/1.73 sq.m. by the MDRD equation. This eGFR is validated for stable chronic renal failure patients. This   equation  is unreliable in acute illness or patients with normal renal function.  GFRAA 05/17/2018 >60.0  >60.0 Final    GFRNA 05/17/2018 >60.0  >60.0 Final    Hemoglobin A1c 05/17/2018 6.4* 4.8 - 5.9 % Final    AVG GLU 05/17/2018 138* 91 - 123 mg/dL Final          Follow-up Disposition:  Return if symptoms worsen or fail to improve.

## 2018-06-04 NOTE — PROGRESS NOTES
Ani Grove is a 60 y.o. female (: 1960) presenting to address:    Chief Complaint   Patient presents with    Hip Pain     left hip       Vitals:    18 1308   BP: 139/73   Pulse: 76   Resp: 22   Temp: 97.3 °F (36.3 °C)   TempSrc: Oral   SpO2: 93%   Weight: 228 lb (103.4 kg)   Height: 5' 4\" (1.626 m)   PainSc:   5   PainLoc: Hip       Hearing/Vision:   No exam data present    Learning Assessment:     Learning Assessment 3/31/2015   PRIMARY LEARNER Patient   HIGHEST LEVEL OF EDUCATION - PRIMARY LEARNER  2 YEARS OF COLLEGE   BARRIERS PRIMARY LEARNER NONE   CO-LEARNER CAREGIVER No   PRIMARY LANGUAGE ENGLISH   LEARNER PREFERENCE PRIMARY DEMONSTRATION   ANSWERED BY self   RELATIONSHIP SELF     Depression Screening:     PHQ over the last two weeks 2018   PHQ Not Done Active Diagnosis of Depression or Bipolar Disorder   Little interest or pleasure in doing things -   Feeling down, depressed or hopeless -   Total Score PHQ 2 -     Fall Risk Assessment:     Fall Risk Assessment, last 12 mths 2018   Able to walk? Yes   Fall in past 12 months? No     Abuse Screening:     Abuse Screening Questionnaire 2018   Do you ever feel afraid of your partner? N   Are you in a relationship with someone who physically or mentally threatens you? N   Is it safe for you to go home? Y     Coordination of Care Questionaire:   1. Have you been to the ER, urgent care clinic since your last visit? Hospitalized since your last visit? NO    2. Have you seen or consulted any other health care providers outside of the [facility] since your last visit? Include any pap smears or colon screening.  NO

## 2018-06-07 ENCOUNTER — HOSPITAL ENCOUNTER (OUTPATIENT)
Dept: PHYSICAL THERAPY | Age: 58
Discharge: HOME | End: 2018-06-07
Payer: COMMERCIAL

## 2018-06-07 DIAGNOSIS — M25.552 LEFT HIP PAIN: ICD-10-CM

## 2018-06-07 DIAGNOSIS — M79.18 GLUTEAL PAIN: ICD-10-CM

## 2018-06-07 PROCEDURE — 97162 PT EVAL MOD COMPLEX 30 MIN: CPT

## 2018-06-07 PROCEDURE — 97110 THERAPEUTIC EXERCISES: CPT
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
## 2018-06-07 NOTE — PROGRESS NOTES
PHYSICAL THERAPY - DAILY TREATMENT NOTE    Patient Name: Caitlin Deluca        Date: 2018  : 1960   yes Patient  Verified  Visit #:     Insurance: Payor: Junius Salts / Plan: [address] PT / Product Type: Commerical /      In time: 730 Out time: 815   Total Treatment Time: 45     Medicare Time Tracking (below)   Total Timed Codes (min):  na 1:1 Treatment Time:  na     TREATMENT AREA =  Left hip pain [M25.552]  Gluteal pain [M79.1]    SUBJECTIVE  Pain Level (on 0 to 10 scale):  4  / 10   Medication Changes/New allergies or changes in medical history, any new surgeries or procedures?    no  If yes, update Summary List   Subjective Functional Status/Changes:  []  No changes reported     SEE POC         OBJECTIVE  10 min Therapeutic Exercise:  [x]  See flow sheet   Rationale:      increase ROM and increase strength to improve the patients ability to perform functional ADL's      min Patient Education:  yes  Established HEP   []  Progressed/Changed HEP based on: Other Objective/Functional Measures:    SEE POC     Post Treatment Pain Level (on 0 to 10) scale:   4  / 10     ASSESSMENT  Assessment/Changes in Function:     Evaluation Code Complexity: History MEDIUM  Complexity : 1-2 comorbidities / personal factors will impact the outcome/ POC ; Examination HIGH Complexity : 4+ Standardized tests and measures addressing body structure, function, activity limitation and / or participation in recreation ; Presentation MEDIUM Complexity : Evolving with changing characteristics ; Decision Making MEDIUM Complexity : FOTO score of 26-74;  Complexity MEDIUM    Justification for Eval Code Complexity:  Patient History : DM, arthritis  Examination SEE ABOVE EXAM  Clinical Presentation: evolving pain  Clinical Decision Making : FOTO 35         []  See Progress Note/Recertification   Patient will continue to benefit from skilled PT services to modify and progress therapeutic interventions, address functional mobility deficits, address ROM deficits, address strength deficits, analyze and address soft tissue restrictions, analyze and cue movement patterns, analyze and modify body mechanics/ergonomics and assess and modify postural abnormalities to attain remaining goals. Progress toward goals / Updated goals:         PLAN  []  Upgrade activities as tolerated yes Continue plan of care   []  Discharge due to :    [x]  Other: Pt will be seen 2 times per week for 12 sessions.       Therapist: Sumeet Luna PT, DPT    Date: 6/7/2018 Time: 7:18 AM     Future Appointments  Date Time Provider Alanis Childs   6/7/2018 7:30 AM Dustin Ceballos PT [facility]

## 2018-06-08 ENCOUNTER — HOSPITAL ENCOUNTER (OUTPATIENT)
Dept: PHYSICAL THERAPY | Age: 58
Discharge: HOME | End: 2018-06-08
Payer: COMMERCIAL

## 2018-06-08 PROCEDURE — 97140 MANUAL THERAPY 1/> REGIONS: CPT

## 2018-06-08 PROCEDURE — 97110 THERAPEUTIC EXERCISES: CPT

## 2018-06-08 NOTE — PROGRESS NOTES
PHYSICAL THERAPY - DAILY TREATMENT NOTE    Patient Name: Odella Ormond        Date: 2018  : 1960   yes Patient  Verified  Visit #:     Insurance: Payor: Richard Peppers / Plan:  [employer] PT / Product Type: Commerical /      In time: 9:30 Out time: 10:26   Total Treatment Time: 56     Medicare/[facility] Time Tracking (below)   Total Timed Codes (min):  na 1:1 Treatment Time:  na     TREATMENT AREA =  Pain in left hip [M25.552]  Myalgia [M79.1]    SUBJECTIVE  Pain Level (on 0 to 10 scale):  3  / 10   Medication Changes/New allergies or changes in medical history, any new surgeries or procedures?    no  If yes, update Summary List   Subjective Functional Status/Changes:  []  No changes reported     Pt reports no significant soreness more than usual after IE yesterday. Reports she did not have a chance to complete HEP as of yet. OBJECTIVE  Modalities Rationale:     decrease pain and increase tissue extensibility to improve patient's ability to complete ADLs   min [] Estim, type/location:                                      []  att     []  unatt     []  w/US     []  w/ice    []  w/heat    min []  Mechanical Traction: type/lbs                   []  pro   []  sup   []  int   []  cont    []  before manual    []  after manual    min []  Ultrasound, settings/location:      min []  Iontophoresis w/ dexamethasone, location:                                               []  take home patch       []  in clinic   10 min []  Ice     [x]  Heat    location/position:  To the L hip/buttock in prone    min []  Vasopneumatic Device, press/temp:     min []  Other:    [x] Skin assessment post-treatment (if applicable):    [x]  intact    []  redness- no adverse reaction     []redness  adverse reaction:        36 min Therapeutic Exercise:  [x]  See flow sheet   Rationale:      increase ROM, increase strength, improve balance and increase proprioception to improve the patients ability to ambulate     10 min Manual Therapy: stm to L TFL in supine, glute med and piri in SL, supine HS str   Rationale:      decrease pain, increase ROM, increase tissue extensibility and decrease trigger points to improve patient's ability to complete transfers     min Patient Education:  yes  Reviewed HEP   []  Progressed/Changed HEP based on: Other Objective/Functional Measures:    Sig ttp to the L TFL and glute med/piri  Inc pain reported at end-range supine bridge; cued for improved glute and core contraction resulting in ability to complete full set w/ dec pain     Post Treatment Pain Level (on 0 to 10) scale:   2  / 10     ASSESSMENT  Assessment/Changes in Function:     Initiated therex this visit per flow in order to improve L hip ROM, flexibility and strength. Pt w/ good tolerance t/o tx, reporting <2/10 pain w/ all exercise. Educated patient in likelihood of DOMS and use of heat/ice and stretching to reduce     []  See Progress Note/Recertification   Patient will continue to benefit from skilled PT services to modify and progress therapeutic interventions, address functional mobility deficits, address ROM deficits, address strength deficits, analyze and address soft tissue restrictions, analyze and cue movement patterns, analyze and modify body mechanics/ergonomics, assess and modify postural abnormalities and instruct in home and community integration to attain remaining goals.    Progress toward goals / Updated goals:    First f/u visit, no change towards goals thus far     PLAN  []  Upgrade activities as tolerated yes Continue plan of care   []  Discharge due to :    []  Other:      Therapist: Fantasma Paul, PT    Date: 6/8/2018 Time: 9:21 AM     Future Appointments  Date Time Provider Alanis Childs   6/8/2018 9:30 AM Fantasma Paul, JUANTIA [facility]   6/12/2018 7:30 AM Sal Ceballos, PT [facility]   6/15/2018 7:30 AM Adeline Velasco PTA [facility]   6/18/2018 7:30 AM Adeline Velasco PTA [facility]   6/22/2018 7:30 AM Win Jesus, PTA [facility]   6/25/2018 7:30 AM Win Jesus, PTA [facility]   6/29/2018 7:30 AM Win Jesus, PTA [facility]   7/5/2018 9:30 AM Winluca Lee, PTA [facility]   7/6/2018 9:30 AM Todd Ceballos, PT [facility]

## 2018-06-08 NOTE — PROGRESS NOTES
Please call pt to inform her that trig level higher than it was 4 months ago. I recommend that she increases her zocor to 20 mg daily. Inform her that her A1c has actually come down a little bit, which is great.

## 2018-06-11 ENCOUNTER — TELEPHONE (OUTPATIENT)
Dept: FAMILY MEDICINE CLINIC | Age: 58
End: 2018-06-11

## 2018-06-11 DIAGNOSIS — E78.5 HYPERLIPIDEMIA, UNSPECIFIED HYPERLIPIDEMIA TYPE: Primary | ICD-10-CM

## 2018-06-11 NOTE — TELEPHONE ENCOUNTER
----- [facility] at 6/8/2018  1:43 PM EDT -----  Please call pt to inform her that trig level higher than it was 4 months ago. I recommend that she increases her zocor to 20 mg daily. Inform her that her A1c has actually come down a little bit, which is great.

## 2018-06-11 NOTE — TELEPHONE ENCOUNTER
Spoke with patient, she said she has been taking fish oil and theratears as advised last appointment however she cannot take statin meds because of side effects. Advised pt to try a low cholesterol diet as well. Pt stated understanding. Please advise if you need to put her on a medication to address the cholesterol.

## 2018-06-12 ENCOUNTER — HOSPITAL ENCOUNTER (OUTPATIENT)
Dept: PHYSICAL THERAPY | Age: 58
Discharge: HOME | End: 2018-06-12
Payer: COMMERCIAL

## 2018-06-12 PROCEDURE — 97140 MANUAL THERAPY 1/> REGIONS: CPT

## 2018-06-12 PROCEDURE — 97110 THERAPEUTIC EXERCISES: CPT

## 2018-06-12 NOTE — PROGRESS NOTES
PHYSICAL THERAPY - DAILY TREATMENT NOTE    Patient Name: Marilee Bunn        Date: 2018  : 1960   yes Patient  Verified  Visit #:   3   of   12  Insurance: Payor: Isabell Banks / Plan: [address] / Product Type: Commerical /      In time: 730 Out time: 310   Total Treatment Time: 59     Medicare/[employer] Time Tracking (below)   Total Timed Codes (min):  na 1:1 Treatment Time:  na     TREATMENT AREA =  Pain in left hip [M25.552]  Myalgia [M79.1]    SUBJECTIVE  Pain Level (on 0 to 10 scale): 1-2 / 10   Medication Changes/New allergies or changes in medical history, any new surgeries or procedures?    no  If yes, update Summary List   Subjective Functional Status/Changes:  []  No changes reported     Pt reports increased soreness in the L lower back, posterior glute and hip following last session 1-2 days following. Pt reports she was able to perform HEP and decreased pain. OBJECTIVE  Modalities Rationale:     decrease pain and increase tissue extensibility to improve patient's ability to complete ADLs   min [] Estim, type/location:                                      []  att     []  unatt     []  w/US     []  w/ice    []  w/heat    min []  Mechanical Traction: type/lbs                   []  pro   []  sup   []  int   []  cont    []  before manual    []  after manual    min []  Ultrasound, settings/location:      min []  Iontophoresis w/ dexamethasone, location:                                               []  take home patch       []  in clinic   10 min []  Ice     [x]  Heat    location/position:  To the L hip/buttock in prone    min []  Vasopneumatic Device, press/temp:     min []  Other:    [x] Skin assessment post-treatment (if applicable):    [x]  intact    []  redness- no adverse reaction     []redness  adverse reaction:        39 min Therapeutic Exercise:  [x]  See flow sheet   Rationale:      increase ROM, increase strength, improve balance and increase proprioception to improve the patients ability to ambulate     10 min Manual Therapy: stm to L TFL in supine, glute med and piri in SL, supine HS str   Rationale:      decrease pain, increase ROM, increase tissue extensibility and decrease trigger points to improve patient's ability to complete transfers     min Patient Education:  yes  Reviewed HEP   []  Progressed/Changed HEP based on: Other Objective/Functional Measures:    See therex per flow sheet  Pt denies any sharp pains or red flags following tx     Post Treatment Pain Level (on 0 to 10) scale:   1-2  / 10     ASSESSMENT  Assessment/Changes in Function:     -presented today with increased muscle \"tightness and spasms\" on the L L/S and TFL- decreased following manual today  -able to perform full therex with no modification  -educated on continued possibility of DOMS-pt verbalized understanding.   - noted increased \"numbness\" feeling on the L lateral hip and posterior glute today, decreased following manual and stretching     []  See Progress Note/Recertification   Patient will continue to benefit from skilled PT services to modify and progress therapeutic interventions, address functional mobility deficits, address ROM deficits, address strength deficits, analyze and address soft tissue restrictions, analyze and cue movement patterns, analyze and modify body mechanics/ergonomics, assess and modify postural abnormalities and instruct in home and community integration to attain remaining goals. Progress toward goals / Updated goals:    No progress towards goals today.      PLAN  []  Upgrade activities as tolerated yes Continue plan of care   []  Discharge due to :    []  Other:      Therapist: Rakesh Martins PT    Date: 6/12/2018 Time: 8:30AM     Future Appointments  Date Time Provider Alanis Childs   6/12/2018 7:30 AM Mario Ceballos PT [facility]   6/15/2018 7:30 AM Dawood Tran PTA [facility]   6/18/2018 7:30 AM Dawood Tran PTA [facility]   6/22/2018 7:30 AM Adeline Velasco, ALBA [facility]   6/25/2018 7:30 AM Adeline Velasco PTA [facility]   6/29/2018 7:30 AM Adeline Velasco, ALBA [facility]   7/5/2018 9:30 AM Adeline Velasco, ALBA [facility]   7/6/2018 9:30 AM Sal Ceballos, PT [facility]

## 2018-06-15 ENCOUNTER — HOSPITAL ENCOUNTER (OUTPATIENT)
Dept: PHYSICAL THERAPY | Age: 58
Discharge: HOME | End: 2018-06-15
Payer: COMMERCIAL

## 2018-06-15 PROCEDURE — 97110 THERAPEUTIC EXERCISES: CPT

## 2018-06-15 PROCEDURE — 97140 MANUAL THERAPY 1/> REGIONS: CPT

## 2018-06-15 NOTE — PROGRESS NOTES
PHYSICAL THERAPY - DAILY TREATMENT NOTE    Patient Name: Marilee Bunn        Date: 6/15/2018  : 1960   YES Patient  Verified  Visit #:     Insurance: Payor: Isabell Banks / Plan: [address] PT / Product Type: Commerical /      In time: 730 Out time: 830   Total Treatment Time: 60     Medicare Time Tracking (below)   Total Timed Codes (min):   1:1 Treatment Time:       TREATMENT AREA =   Pain in left hip [M25.552]  Myalgia [M79.1]    SUBJECTIVE  Pain Level (on 0 to 10 scale):  2  / 10   Medication Changes/New allergies or changes in medical history, any new surgeries or procedures? NO    If yes, update Summary List   Subjective Functional Status/Changes:  []  No changes reported     I think that therapy has been helping. When I walk I don't feel like I favor my left leg as much and I feel like my giat is improving. This morning I didn't have to help me leg into the car. OBJECTIVE  Modalities Rationale:     decrease inflammation and decrease pain to improve patient's ability to complete ADLs   min [] Estim, type/location:                                      []  att     []  unatt     []  w/US     []  w/ice    []  w/heat    min []  Mechanical Traction: type/lbs                   []  pro   []  sup   []  int   []  cont    []  before manual    []  after manual    min []  Ultrasound, settings/location:      min []  Iontophoresis w/ dexamethasone, location:                                               []  take home patch       []  in clinic   10 min []  Ice     [x]  Heat    location/position:  To the L hip/buttock in prone    min []  Vasopneumatic Device, press/temp:     min []  Other:    [x] Skin assessment post-treatment (if applicable):    [x]  intact    []  redness- no adverse reaction     []redness  adverse reaction:        40 min Therapeutic Exercise:  [x]  See flow sheet   Rationale:      increase ROM, increase strength, improve coordination and improve balance to improve the patients ability to complete ADLs     10 min Manual Therapy: stm to L TFL in supine, glute med and piri in SL, supine HS str   Rationale:      decrease pain, increase ROM and increase tissue extensibility to improve patient's ability to complete ADLs       min Patient Education:  YES  Reviewed HEP   []  Progressed/Changed HEP based on: Other Objective/Functional Measures:    Good release of piriformis and TFL with manual, TTP in these regions. Add: SL balance on (L) 3 x 10 seconds. Post Treatment Pain Level (on 0 to 10) scale:   2  / 10     ASSESSMENT  Assessment/Changes in Function:     Reporting improved mobility of (L) hip and decrease pain with functional mobility activities. []  See Progress Note/Recertification   Patient will continue to benefit from skilled PT services to modify and progress therapeutic interventions, address functional mobility deficits, address ROM deficits, address strength deficits, analyze and address soft tissue restrictions and analyze and cue movement patterns to attain remaining goals.    Progress toward goals / Updated goals:    Steadily progressing with pain reduction and performance of functional mobility activities     PLAN  []  Upgrade activities as tolerated YES Continue plan of care   []  Discharge due to :    []  Other:      Therapist: CARY Mejias    Date: 6/15/2018 Time: 6:17 AM       Future Appointments  Date Time Provider Alanis Childs   6/15/2018 7:30 AM Tony [facility]   6/18/2018 7:30 AM [facility]   6/22/2018 7:30 AM [facility]   6/25/2018 7:30 AM [facility]   6/29/2018 7:30 AM [facility]   7/5/2018 9:30 AM [facility]   7/6/2018 9:30 AM Og Ceballos, PT [facility]

## 2018-06-18 ENCOUNTER — HOSPITAL ENCOUNTER (OUTPATIENT)
Dept: PHYSICAL THERAPY | Age: 58
Discharge: HOME | End: 2018-06-18
Payer: COMMERCIAL

## 2018-06-18 PROCEDURE — 97110 THERAPEUTIC EXERCISES: CPT

## 2018-06-18 PROCEDURE — 97140 MANUAL THERAPY 1/> REGIONS: CPT

## 2018-06-18 NOTE — PROGRESS NOTES
PHYSICAL THERAPY - DAILY TREATMENT NOTE    Patient Name: Aida Boyer        Date: 2018  : 1960   YES Patient  Verified  Visit #:      of   12  Insurance: Payor: Aaliyah Oats / Plan: [address] PT / Product Type: Commerical /      In time: 725 Out time: 830   Total Treatment Time: 65     Medicare Time Tracking (below)   Total Timed Codes (min):   1:1 Treatment Time:       TREATMENT AREA =  Pain in left hip [M25.552]  Myalgia [M79.1]    SUBJECTIVE  Pain Level (on 0 to 10 scale):  2  / 10   Medication Changes/New allergies or changes in medical history, any new surgeries or procedures? NO    If yes, update Summary List   Subjective Functional Status/Changes:  []  No changes reported     I hurt like a son of a gun. I was in the car driving for like 10 hours. OBJECTIVE  Modalities Rationale:     decrease inflammation and decrease pain to improve patient's ability to complete ADLs   min [] Estim, type/location:                                      []  att     []  unatt     []  w/US     []  w/ice    []  w/heat    min []  Mechanical Traction: type/lbs                   []  pro   []  sup   []  int   []  cont    []  before manual    []  after manual    min []  Ultrasound, settings/location:      min []  Iontophoresis w/ dexamethasone, location:                                               []  take home patch       []  in clinic   10 min [x]  Ice     []  Heat    location/position:  To the L hip/buttock in prone    min []  Vasopneumatic Device, press/temp:     min []  Other:    [x] Skin assessment post-treatment (if applicable):    [x]  intact    []  redness- no adverse reaction     []redness  adverse reaction:        40 min Therapeutic Exercise:  [x]  See flow sheet   Rationale:      increase ROM, increase strength, improve coordination and improve balance to improve the patients ability to complete ADLs     15 min Manual Therapy: stm to L TFL in supine, glute med and piri in SL, supine HS str   Rationale:      decrease pain, increase ROM and increase tissue extensibility to improve patient's ability to complete ADLs     min Patient Education:  YES  Reviewed HEP   []  Progressed/Changed HEP based on: Other Objective/Functional Measures:    Increase TTP along entire hip musculature and TFL. Post Treatment Pain Level (on 0 to 10) scale:   2  / 10     ASSESSMENT  Assessment/Changes in Function:     Patient reports non-compliance over the weekend with stretching and therx     []  See Progress Note/Recertification   Patient will continue to benefit from skilled PT services to modify and progress therapeutic interventions, address functional mobility deficits, address ROM deficits, address strength deficits, analyze and address soft tissue restrictions and analyze and cue movement patterns to attain remaining goals. Progress toward goals / Updated goals:    No change toward goals today.      PLAN  []  Upgrade activities as tolerated YES Continue plan of care   []  Discharge due to :    []  Other:      Therapist: CARY Gloria    Date: 6/18/2018 Time: 6:38 AM       Future Appointments  Date Time Provider Alanis Childs   6/18/2018 7:30 AM Rivera Kern PTA [facility]   6/22/2018 7:30 AM Rivera Kern, PTA [facility]   6/25/2018 7:30 AM Rivera Kern, ALBA [facility]   6/29/2018 7:30 AM Rivera Kern PTA [facility]   7/5/2018 9:30 AM Rivera Kern PTA [facility]   7/6/2018 9:30 AM Ladi Ceballos, PT [facility]

## 2018-06-22 ENCOUNTER — HOSPITAL ENCOUNTER (OUTPATIENT)
Dept: PHYSICAL THERAPY | Age: 58
Discharge: HOME | End: 2018-06-22
Payer: COMMERCIAL

## 2018-06-22 PROCEDURE — 97110 THERAPEUTIC EXERCISES: CPT

## 2018-06-22 PROCEDURE — 97140 MANUAL THERAPY 1/> REGIONS: CPT

## 2018-06-22 NOTE — PROGRESS NOTES
PHYSICAL THERAPY - DAILY TREATMENT NOTE    Patient Name: Madelin Lazar        Date: 2018  : 1960   YES Patient  Verified  Visit #:     Insurance: Payor: Garry Shaffer / Plan: [address] PT / Product Type: Commerical /      In time: 730 Out time: 830   Total Treatment Time: 60     Medicare Time Tracking (below)   Total Timed Codes (min):   1:1 Treatment Time:       TREATMENT AREA =  Pain in left hip [M25.552]  Myalgia [M79.1]    SUBJECTIVE  Pain Level (on 0 to 10 scale):  3  / 10   Medication Changes/New allergies or changes in medical history, any new surgeries or procedures? NO    If yes, update Summary List   Subjective Functional Status/Changes:  []  No changes reported     I just think I may have over stretched yesterday. OBJECTIVE  Modalities Rationale:     decrease inflammation and decrease pain to improve patient's ability to complete ADLs   min [] Estim, type/location:                                      []  att     []  unatt     []  w/US     []  w/ice    []  w/heat    min []  Mechanical Traction: type/lbs                   []  pro   []  sup   []  int   []  cont    []  before manual    []  after manual    min []  Ultrasound, settings/location:      min []  Iontophoresis w/ dexamethasone, location:                                               []  take home patch       []  in clinic   10 min [x]  Ice     []  Heat    location/position:  To the L hip/buttock in prone    min []  Vasopneumatic Device, press/temp:     min []  Other:    [x] Skin assessment post-treatment (if applicable):    [x]  intact    []  redness- no adverse reaction     []redness  adverse reaction:        35 min Therapeutic Exercise:  [x]  See flow sheet   Rationale:      increase ROM, increase strength, improve coordination and improve balance to improve the patients ability to complete ADLs    15 min Manual Therapy: stm to L TFL in supine, glute med and piri in SL, supine HS str   Rationale: decrease pain, increase ROM and increase tissue extensibility to improve patient's ability to complete ADLs     min Patient Education:  YES  Reviewed HEP   []  Progressed/Changed HEP based on: Other Objective/Functional Measures:    Reports ~ 45% improvement with walking and standing activities. Reports that she feels like she has a normal gait and does not favor her left leg. Post Treatment Pain Level (on 0 to 10) scale:   1  / 10     ASSESSMENT  Assessment/Changes in Function:     GROC +4 moderately better  FOTO 59     []  See Progress Note/Recertification   Patient will continue to benefit from skilled PT services to modify and progress therapeutic interventions, address functional mobility deficits, address ROM deficits, address strength deficits, analyze and address soft tissue restrictions and analyze and cue movement patterns to attain remaining goals. Progress toward goals / Updated goals: · Short Term Goals: To be accomplished in  6  treatments:  1. Pt to demonstrate independence with HEP to improve functional mobility for ADLs. Achieved  2. Pt will report 50% overall improvement with standing and walking tolerance in order to improve functional ability to perform ADL's. Progressing    · Long Term Goals: To be accomplished in  12  treatments:  3. Improve FOTO outcome score by 10-15% in order to indicate a significant improvement in ADL function. Achieved     PLAN  []  Upgrade activities as tolerated YES Continue plan of care   []  Discharge due to :    []  Other:      Therapist: CARY Mejias    Date: 6/22/2018 Time: 6:14 AM       Future Appointments  Date Time Provider Alanis Childs   6/22/2018 7:30 AM Tony Naik PTA [facility]   6/25/2018 7:30 AM Tony Naik PTA [facility]   6/29/2018 7:30 AM Tony Naik PTA [facility]   7/5/2018 9:30 AM Tony Naik PTA [facility]   7/6/2018 9:30 AM Og Ceballos, JUANITA [facility]

## 2018-06-25 ENCOUNTER — HOSPITAL ENCOUNTER (OUTPATIENT)
Dept: PHYSICAL THERAPY | Age: 58
Discharge: HOME | End: 2018-06-25
Payer: COMMERCIAL

## 2018-06-25 PROCEDURE — 97140 MANUAL THERAPY 1/> REGIONS: CPT

## 2018-06-25 PROCEDURE — 97110 THERAPEUTIC EXERCISES: CPT

## 2018-06-25 PROCEDURE — 97014 ELECTRIC STIMULATION THERAPY: CPT

## 2018-06-25 NOTE — TELEPHONE ENCOUNTER
Please call pt to remind her to return for lipid panel in 6 weeks. Continue to have a low chol diet and take the theratears. If levels are still too high then will most likey start fenofibrate, which is not a statin.

## 2018-06-25 NOTE — TELEPHONE ENCOUNTER
Called patient and advised that she needed to return to office after the 29th for a recheck of her fasting lipid panel. Patient states she will come on the 5th of July to have the lab performed. Please sign off on lab order.

## 2018-06-25 NOTE — PROGRESS NOTES
PHYSICAL THERAPY - DAILY TREATMENT NOTE    Patient Name: Pina Slater        Date: 2018  : 1960   YES Patient  Verified  Visit #:     Insurance: Payor: Alfredo Pfeiffer / Plan: [address] / Product Type: Commerical /      In time: 730 Out time: 830   Total Treatment Time: 60     Medicare Time Tracking (below)   Total Timed Codes (min):   1:1 Treatment Time:       TREATMENT AREA =  Pain in left hip [M25.552]  Myalgia [M79.1]    SUBJECTIVE  Pain Level (on 0 to 10 scale):  3  / 10   Medication Changes/New allergies or changes in medical history, any new surgeries or procedures? NO    If yes, update Summary List   Subjective Functional Status/Changes:  []  No changes reported     I don't know why I have pain in my hip cause I didn't do anything bad.           OBJECTIVE  Modalities Rationale:     decrease inflammation and decrease pain to improve patient's ability to complete ADLs  10 min [x] Estim, type/location:  IFC (L) hip prone                                    []  att     [x]  unatt     []  w/US     [x]  w/ice    []  w/heat    min []  Mechanical Traction: type/lbs                   []  pro   []  sup   []  int   []  cont    []  before manual    []  after manual    min []  Ultrasound, settings/location:      min []  Iontophoresis w/ dexamethasone, location:                                               []  take home patch       []  in clinic    min []  Ice     []  Heat    location/position:     min []  Vasopneumatic Device, press/temp:     min []  Other:    [x] Skin assessment post-treatment (if applicable):    [x]  intact    []  redness- no adverse reaction     []redness  adverse reaction:        35 min Therapeutic Exercise:  [x]  See flow sheet   Rationale:      increase ROM, increase strength, improve coordination and improve balance to improve the patients ability to complete ADLs    15 min Manual Therapy: stm to L TFL in supine, glute med and piri in SL, supine HS str Rationale:      decrease pain, increase ROM and increase tissue extensibility to improve patient's ability to complete ADLs     min Patient Education:  YES  Reviewed HEP   []  Progressed/Changed HEP based on: Other Objective/Functional Measures:    Significant increase TTP along upper glute and piriformis with increase tone throughout. Overall good release of tone with manual     Post Treatment Pain Level (on 0 to 10) scale:   5  / 10     ASSESSMENT  Assessment/Changes in Function:     Increase pain with standing and walking as reported by patient for no known reason. Reports that she has been compliant with HEP. Has not had relief of pain since flare up over the weekend. []  See Progress Note/Recertification   Patient will continue to benefit from skilled PT services to modify and progress therapeutic interventions, address functional mobility deficits, address ROM deficits, address strength deficits, analyze and address soft tissue restrictions and analyze and cue movement patterns to attain remaining goals.    Progress toward goals / Updated goals:    Slow progression noted with pain reduction     PLAN  []  Upgrade activities as tolerated YES Continue plan of care   []  Discharge due to :    []  Other:      Therapist: CARY Padilla    Date: 6/25/2018 Time: 6:47 AM       Future Appointments  Date Time Provider Alanis Childs   6/25/2018 7:30 AM Franklyn Smith PTA [facility]   6/29/2018 7:30 AM Franklyn Smith PTA [facility]   7/5/2018 9:30 AM Franklyn Smith PTA [facility]   7/6/2018 7:30 AM Franklyn Smith [facility]

## 2018-06-29 ENCOUNTER — HOSPITAL ENCOUNTER (OUTPATIENT)
Dept: PHYSICAL THERAPY | Age: 58
Discharge: HOME | End: 2018-06-29
Payer: COMMERCIAL

## 2018-06-29 PROCEDURE — 97110 THERAPEUTIC EXERCISES: CPT

## 2018-06-29 PROCEDURE — 97140 MANUAL THERAPY 1/> REGIONS: CPT

## 2018-06-29 PROCEDURE — 97014 ELECTRIC STIMULATION THERAPY: CPT

## 2018-06-29 NOTE — PROGRESS NOTES
PHYSICAL THERAPY - DAILY TREATMENT NOTE    Patient Name: Radha Durant        Date: 2018  : 1960   YES Patient  Verified  Visit #:     Insurance: Payor: Frank Arevalo / Plan: [address] [place] [address] Rd PT / Product Type: Commerical /      In time: 725 Out time: 825   Total Treatment Time: 60     Medicare Time Tracking (below)   Total Timed Codes (min):   1:1 Treatment Time:       TREATMENT AREA =  Pain in left hip [M25.552]  Myalgia [M79.1]    SUBJECTIVE  Pain Level (on 0 to 10 scale):  3  / 10   Medication Changes/New allergies or changes in medical history, any new surgeries or procedures? NO    If yes, update Summary List   Subjective Functional Status/Changes:  []  No changes reported     The last dosage I took was yesterday morning and I didn't take it today.           OBJECTIVE  Modalities Rationale:     decrease inflammation and decrease pain to improve patient's ability to complete ADLs  10 min [x] Estim, type/location:  IFC (L) hip prone                                    []  att     [x]  unatt     []  w/US     [x]  w/ice    []  w/heat    min []  Mechanical Traction: type/lbs                   []  pro   []  sup   []  int   []  cont    []  before manual    []  after manual    min []  Ultrasound, settings/location:      min []  Iontophoresis w/ dexamethasone, location:                                               []  take home patch       []  in clinic    min []  Ice     []  Heat    location/position:     min []  Vasopneumatic Device, press/temp:     min []  Other:    [x] Skin assessment post-treatment (if applicable):    [x]  intact    []  redness- no adverse reaction     []redness  adverse reaction:        40 min Therapeutic Exercise:  [x]  See flow sheet   Rationale:      increase ROM, increase strength, improve coordination and improve balance to improve the patients ability to complete ADLs    10 min Manual Therapy: DTM/TPR glute med and piri in SL, hip 'prom, piriformis stretch Rationale:      decrease pain, increase ROM and increase tissue extensibility to improve patient's ability to complete ADLs     min Patient Education:  YES  Reviewed HEP   []  Progressed/Changed HEP based on: Other Objective/Functional Measures:    Modified therx to address pain levels. Performed well with good tolerance     Post Treatment Pain Level (on 0 to 10) scale:   1  / 10     ASSESSMENT  Assessment/Changes in Function:     No change in function. reports initiating use of pain medication again. Patient states that she has an increase of pain since last treatment session which has caused difficulty with standing, walking and sitting. []  See Progress Note/Recertification   Patient will continue to benefit from skilled PT services to modify and progress therapeutic interventions, address functional mobility deficits, address ROM deficits, address strength deficits, analyze and address soft tissue restrictions and analyze and cue movement patterns to attain remaining goals. Progress toward goals / Updated goals:    Continued slow progression with pain reduction and performance of functional mobility activities.      PLAN  []  Upgrade activities as tolerated YES Continue plan of care   []  Discharge due to :    []  Other:      Therapist: Charlotte Siemens, LPTA    Date: 6/29/2018 Time: 6:11 AM       Future Appointments  Date Time Provider Alanis Childs   6/29/2018 7:30 AM Leandro Hong PTA [facility]   7/5/2018 9:30 AM Leandro Hong PTA [facility]   7/6/2018 7:30 AM Leandro Hong PTA [facility]

## 2018-07-02 ENCOUNTER — HOSPITAL ENCOUNTER (OUTPATIENT)
Dept: PHYSICAL THERAPY | Age: 58
Discharge: HOME | End: 2018-07-02
Payer: COMMERCIAL

## 2018-07-02 PROCEDURE — 97014 ELECTRIC STIMULATION THERAPY: CPT

## 2018-07-02 PROCEDURE — 97140 MANUAL THERAPY 1/> REGIONS: CPT

## 2018-07-02 PROCEDURE — 97110 THERAPEUTIC EXERCISES: CPT

## 2018-07-02 NOTE — PROGRESS NOTES
PHYSICAL THERAPY - DAILY TREATMENT NOTE    Patient Name: Josefina Chavis        Date: 2018  : 1960   YES Patient  Verified  Visit #:     Insurance: Payor: [employer] / Plan: [address] / Product Type: Commerical /      In time: 730 Out time: 820   Total Treatment Time: 50     Medicare Time Tracking (below)   Total Timed Codes (min):   1:1 Treatment Time:       TREATMENT AREA =   Pain in left hip [M25.552]  Myalgia [M79.1]    SUBJECTIVE  Pain Level (on 0 to 10 scale):  2  / 10   Medication Changes/New allergies or changes in medical history, any new surgeries or procedures? NO    If yes, update Summary List   Subjective Functional Status/Changes:  []  No changes reported     Yesterday I woke up and I had no pan at all. Then I did the exercises and I started having a little more pain.            OBJECTIVE  Modalities Rationale:     decrease inflammation and decrease pain to improve patient's ability to complete ADLs  10 min [x] Estim, type/location:  IFC (L) hip prone                                    []  att     []  unatt     []  w/US     []  w/ice    []  w/heat    min []  Mechanical Traction: type/lbs                   []  pro   []  sup   []  int   []  cont    []  before manual    []  after manual    min []  Ultrasound, settings/location:      min []  Iontophoresis w/ dexamethasone, location:                                               []  take home patch       []  in clinic    min []  Ice     []  Heat    location/position:     min []  Vasopneumatic Device, press/temp:     min []  Other:    [x] Skin assessment post-treatment (if applicable):    [x]  intact    []  redness- no adverse reaction     []redness  adverse reaction:        30 min Therapeutic Exercise:  [x]  See flow sheet   Rationale:      increase ROM, increase strength, improve coordination and improve balance to improve the patients ability to complete ADLs     10 min Manual Therapy: DTM/TPR glute med and piri in SL, hip 'prom, piriformis stretch   Rationale:      decrease pain, increase ROM and increase tissue extensibility to improve patient's ability to complete ADLs     min Patient Education:  YES  Reviewed HEP   []  Progressed/Changed HEP based on: Other Objective/Functional Measures:    See PN     Post Treatment Pain Level (on 0 to 10) scale:  3 / 10     ASSESSMENT  Assessment/Changes in Function:     See PN     []  See Progress Note/Recertification   Patient will continue to benefit from skilled PT services to modify and progress therapeutic interventions, address functional mobility deficits, address ROM deficits, address strength deficits, analyze and address soft tissue restrictions and analyze and cue movement patterns to attain remaining goals.    Progress toward goals / Updated goals:    See PN     PLAN  []  Upgrade activities as tolerated YES Continue plan of care   []  Discharge due to :    []  Other:      Therapist: CARY Gonzalez    Date: 7/2/2018 Time: 6:43 AM       Future Appointments  Date Time Provider Alanis Childs   7/2/2018 7:30 AM Be Rocha PTA [facility]   7/6/2018 7:30 AM Be Rocha PTA [facility]

## 2018-07-02 NOTE — PROGRESS NOTES
[facility] PHYSICAL THERAPY  [address] [place] [address],[facility], [facility] - Phone: [phone]  Fax: [phone]   PROGRESS NOTE    Patient Name: Valentin Michaels : 1960   Treatment/Medical Diagnosis: Pain in left hip [M25.552]  Myalgia [M79.1]     Referral Source: Marco Chavez MD     Date of Initial Visit: 2018 Attended Visits: 9 Missed Visits: 0     SUMMARY OF TREATMENT  Valentin Michaels has been seen at our clinic 2-3x/wk for a total of 9 visits. Pt treatment has consisted of aerobic warm-up with treadmill, therapeutic exercise for lumbar ROM, hip/core strengthening, modalities prn and manual therapy (DTM/TPR glute med and piri in SL, hip prom, piriformis stretch)    CURRENT STATUS  Pt has had a good tolerance to physical therapy treatment. Patient has reported an overall improvement with her ability to walk and stand, but continues to have pain. Patient reports that she feels more \"normal\" when walking  Patient reports that she had stopped taking her medication and feels that's why her pain was increasing, so she has started on the medication again. Patient continues to present with TTP along (L) hip musculature (piriformis, glute med and upper glute region) with manual treatment. No significant restrictions noted with ROM of (L) hip. Patient reports that she does perform her HEP. At this time, patient's primary complaint is increase hip pain with prolonged functional mobility activities. Average pain level is 2-3/10. Goal/Measure of Progress Goal Met? 1. Patient will demo 4+/5 hip ABD strength for decreased gait deviations   Status at last Eval: reduced Current Status: reduced progressing   2. Patient will report 50% improvement in sitting tolerance for ease with daily activities    Status at last Eval:  Current Status: ~ 45% improvement with walking and standing activities Slow progression   3.   Improve FOTO outcome score by 10-15% in order to indicate a significant improvement in ADL function. Status at last Eval: 35 Current Status: 59 yes     4. Patient will be independent with long term HEP in order to prepare for DC to home. Status at last Eval:  Current Status:  yes     New Goals to be achieved in __4_  weeks:  1. Continue with goals 1 and 2 above. 2.  Patient to score +5 (a good deal better) on GROC to indicate improvement with functional mobility activities. RECOMMENDATIONS  We would like to continue with therapy 2x/wk for 4 weeks to progress patient further with pain reduction and performance of all general ADLs and functional mobility activities. Please advise. Thank you. If you have any questions/comments please contact us directly at [phone]. Thank you for allowing us to assist in the care of your patient. LPTA Signature: Esdras Noguera PTA Date: 7/2/2018   PT Signature: Alexandria Ceballos PT Time: 12:53 PM     NOTE TO PHYSICIAN:  PLEASE COMPLETE THE ORDERS BELOW AND FAX TO   [facility]: [phone]  If you are unable to process this request in 24 hours please contact our office: [phone]    ___ I have read the above report and request that my patient continue as recommended.   ___ I have read the above report and request that my patient continue therapy with the following changes/special instructions:_________________________________________________________   ___ I have read the above report and request that my patient be discharged from therapy.      Physician Signature:        Date:       Time:

## 2018-07-05 ENCOUNTER — APPOINTMENT (OUTPATIENT)
Dept: PHYSICAL THERAPY | Age: 58
End: 2018-07-05
Payer: COMMERCIAL

## 2018-07-06 ENCOUNTER — HOSPITAL ENCOUNTER (OUTPATIENT)
Dept: PHYSICAL THERAPY | Age: 58
Discharge: HOME | End: 2018-07-06
Payer: COMMERCIAL

## 2018-07-06 PROCEDURE — 97140 MANUAL THERAPY 1/> REGIONS: CPT

## 2018-07-06 PROCEDURE — 97014 ELECTRIC STIMULATION THERAPY: CPT

## 2018-07-06 PROCEDURE — 97110 THERAPEUTIC EXERCISES: CPT

## 2018-07-06 NOTE — PROGRESS NOTES
PHYSICAL THERAPY - DAILY TREATMENT NOTE    Patient Name: Deanna Aceves        Date: 2018  : 1960   YES Patient  Verified  Visit #:   10   of   20  Insurance: Payor: Jessy Ribeiro / [facility] [address] [facility] / Product Type: Commerical /      In time: 730 Out time: 825   Total Treatment Time: 55     Medicare Time Tracking (below)   Total Timed Codes (min):   1:1 Treatment Time:       TREATMENT AREA =   Pain in left hip [M25.552]  Myalgia [M79.1    SUBJECTIVE  Pain Level (on 0 to 10 scale):  2  / 10   Medication Changes/New allergies or changes in medical history, any new surgeries or procedures? NO    If yes, update Summary List   Subjective Functional Status/Changes:  []  No changes reported     I had no pain on the  so I think it's getting better.           OBJECTIVE  Modalities Rationale:     decrease inflammation and decrease pain to improve patient's ability to complete ADLs  10 min [] Estim, type/location:  IFC (L) hip prone                                    []  att     [x]  unatt     []  w/US     [x]  w/ice    []  w/heat    min []  Mechanical Traction: type/lbs                   []  pro   []  sup   []  int   []  cont    []  before manual    []  after manual    min []  Ultrasound, settings/location:      min []  Iontophoresis w/ dexamethasone, location:                                               []  take home patch       []  in clinic    min []  Ice     []  Heat    location/position:     min []  Vasopneumatic Device, press/temp:     min []  Other:    [x] Skin assessment post-treatment (if applicable):    [x]  intact    []  redness- no adverse reaction     []redness  adverse reaction:        30 min Therapeutic Exercise:  [x]  See flow sheet   Rationale:      increase ROM, increase strength, improve coordination and improve balance to improve the patients ability to complete ADLs    15 min Manual Therapy: DTM/TPR glute med and piri in SL, hip 'prom, piriformis stretch Rationale:      decrease pain, increase ROM and increase tissue extensibility to improve patient's ability to complete ADLs     min Patient Education:  YES  Reviewed HEP   []  Progressed/Changed HEP based on: Other Objective/Functional Measures:    Decrease restrictions noted with ER stretch and PROM of hip. TTP with deep TPR along (L) hip (piriformis/glute). Post Treatment Pain Level (on 0 to 10) scale:   0  / 10     ASSESSMENT  Assessment/Changes in Function:     Reports no pain with functional mobility activities on the 4th of July day. []  See Progress Note/Recertification   Patient will continue to benefit from skilled PT services to modify and progress therapeutic interventions, address functional mobility deficits, address ROM deficits, address strength deficits, analyze and address soft tissue restrictions and analyze and cue movement patterns to attain remaining goals. Progress toward goals / Updated goals:    No change toward new goals today.      PLAN  []  Upgrade activities as tolerated YES Continue plan of care   []  Discharge due to :    []  Other:      Therapist: CARY Moore    Date: 7/6/2018 Time: 6:09 AM       Future Appointments  Date Time Provider Alanis Childs   7/6/2018 7:30 AM Delona Ream,  [facility]   7/9/2018 7:00 AM Delona Ream, PTA [address] [place] [address]   7/13/2018 7:30 AM Delona Ream,  [facility]   7/18/2018 7:00 AM Delona Ream,  [facility]   7/20/2018 7:30 AM Delona Ream,  [facility]   7/23/2018 7:30 AM Delona Ream,  [facility]   7/27/2018 7:30 AM Delona Ream,  [facility]   7/30/2018 7:30 AM Delona Ream,  [facility]

## 2018-07-07 LAB
CHOLEST SERPL-MCNC: 191 MG/DL (ref 110–200)
HDLC SERPL-MCNC: 31 MG/DL (ref 40–59)
HDLC SERPL-MCNC: 6.2 MG/DL (ref 0–5)
LDLC SERPL CALC-MCNC: 93 MG/DL (ref 50–99)
TRIGL SERPL-MCNC: 333 MG/DL (ref 40–149)
VLDLC SERPL CALC-MCNC: 67 MG/DL (ref 8–30)

## 2018-07-09 ENCOUNTER — HOSPITAL ENCOUNTER (OUTPATIENT)
Dept: PHYSICAL THERAPY | Age: 58
End: 2018-07-09
Payer: COMMERCIAL

## 2018-07-11 ENCOUNTER — OFFICE VISIT (OUTPATIENT)
Dept: FAMILY MEDICINE CLINIC | Age: 58
End: 2018-07-11

## 2018-07-11 VITALS
SYSTOLIC BLOOD PRESSURE: 129 MMHG | DIASTOLIC BLOOD PRESSURE: 72 MMHG | BODY MASS INDEX: 38.24 KG/M2 | TEMPERATURE: 96.9 F | WEIGHT: 224 LBS | HEART RATE: 82 BPM | RESPIRATION RATE: 18 BRPM | OXYGEN SATURATION: 95 % | HEIGHT: 64 IN

## 2018-07-11 DIAGNOSIS — R00.2 HEART PALPITATIONS: Primary | ICD-10-CM

## 2018-07-11 DIAGNOSIS — S99.912A INJURY OF LEFT ANKLE, INITIAL ENCOUNTER: ICD-10-CM

## 2018-07-11 RX ORDER — ESCITALOPRAM OXALATE 10 MG/1
10 TABLET ORAL DAILY
COMMUNITY

## 2018-07-11 NOTE — LETTER
NOTIFICATION RETURN TO WORK  
 
7/11/2018 12:51 PM 
 
Ms. Akua Kuo [address] [facility] [address] To Whom It May Concern: 
 
Akua Kuo is currently under the care of [address] [place] [address]. She will return to work/school on: 07.16.18 If there are questions or concerns please have the patient contact our office. Sincerely, JESSICA Mills

## 2018-07-11 NOTE — LETTER
7/22/2018 9:54 PM 
 
RE:    Maame Jacobson [address] [facility] [address] Thank you for agreeing to see Rolly Matos I am referring my patient to you for evaluation of a possible ankle fracture. Please see her 
pertinent patient information below. Problem List:    
Patient Active Problem List  
 Diagnosis Date Noted  Severe obesity (BMI 35.0-39. 9) with comorbidity ([facility] 05/18/2018  Type 2 diabetes mellitus with nephropathy ([facility] 02/06/2018  Type 2 diabetes mellitus with microalbuminuria, without long-term current use of insulin ([facility] 06/09/2017  Statin intolerance 01/25/2017  Osteopenia 06/07/2016  Seasonal affective disorder ([facility] 05/13/2015  Obesity (BMI 30-39.9) 03/31/2015  Allergy-induced asthma 03/31/2015  Tobacco use 03/31/2015  Vitamin D deficiency 03/31/2015  Sciatic pain 03/31/2015  Mixed hypercholesterolemia and hypertriglyceridemia 03/31/2015 Medical History:    
Past Medical History:  
Diagnosis Date  Asthma  Diabetes ([facility]  Hypercholesterolemia Allergies: Allergies Allergen Reactions  Animal Dander Other (comments) asthma  Keflex [Cephalexin] Hives  Mold Extracts Other (comments) asthma  Other Plant, Animal, Environmental Other (comments)  
  blisters  Percocet [Oxycodone-Acetaminophen] Hives  Pollen Extracts Other (comments) asthma Medications:    
Current Outpatient Prescriptions Medication Sig  escitalopram oxalate (LEXAPRO) 10 mg tablet Take 10 mg by mouth daily.  lisinopril (PRINIVIL, ZESTRIL) 5 mg tablet Take 1 Tab by mouth daily. Indications: Diabetic Nephropathy, hypertension  metFORMIN ER (GLUCOPHAGE XR) 500 mg tablet Take 1 Tab by mouth daily. Indications: type 2 diabetes mellitus  albuterol (PROVENTIL HFA, VENTOLIN HFA, PROAIR HFA) 90 mcg/actuation inhaler Take 2 Puffs by inhalation every six (6) hours as needed for Wheezing.  tiZANidine (ZANAFLEX) 4 mg tablet One tab twice daily prn muscle tension  albuterol (PROVENTIL VENTOLIN) 2.5 mg /3 mL (0.083 %) nebulizer solution 3 mL by Nebulization route every four (4) hours as needed for Wheezing.  calcium-cholecalciferol, d3, (CALCIUM+D) 400-133.3 mg-unit tab Take  by mouth.  fenofibrate nanocrystallized (TRICOR) 48 mg tablet Take 1 Tab by mouth daily.  budesonide-formoterol (SYMBICORT) 80-4.5 mcg/actuation HFAA Take 2 Puffs by inhalation two (2) times a day for 90 days. No current facility-administered medications for this visit. I appreciate your assistance in Ms. Smith's care  and look forward to your findings and recommendations. Sincerely, JESSICA Moise

## 2018-07-11 NOTE — MR AVS SNAPSHOT
[address] [address] 
[phone] Patient: Armand Greenfield MRN: [id number] HQ Visit Information Date & Time Provider Department Dept. Phone Encounter #  
 2018 11:00 AM Salma Villalobos, [address] [phone] [id number] Upcoming Health Maintenance Date Due Hepatitis C Screening 1960 Pneumococcal 19-64 Medium Risk (1 of 1 - PPSV23) 1978 BREAST CANCER SCRN MAMMOGRAM 2016 COLON CANCER SCRN (BARIUM / CT COLO / FLX SIG) Q5 2017 MICROALBUMIN Q1 2018 FOOT EXAM Q1 2018 Influenza Age 5 to Adult 2018 EYE EXAM RETINAL OR DILATED Q1 11/3/2018 HEMOGLOBIN A1C Q6M 2018 LIPID PANEL Q1 2019 DTaP/Tdap/Td series (2 - Td) 2026 Allergies as of 2018  Review Complete On: 2018 By: Judith Cloon LPN Severity Noted Reaction Type Reactions Animal Dander  2009    Other (comments) asthma Keflex [Cephalexin]  2015    Hives Mold Extracts  2009    Other (comments) asthma Other Plant, Animal, Environmental  2009    Other (comments)  
 blisters Percocet [Oxycodone-acetaminophen]  2015    Hives Pollen Extracts  2009    Other (comments) asthma Current Immunizations  Never Reviewed Name Date Influenza Vaccine 10/2/2015  8:39 AM  
 Influenza Vaccine (Quad) PF 2016 Tdap 2016 Not reviewed this visit You Were Diagnosed With   
  
 Codes Comments Heart palpitations    -  Primary ICD-10-CM: R00.2 ICD-9-CM: 785.1 Injury of left ankle, initial encounter     ICD-10-CM: R33.421Q ICD-9-CM: 659. 7 Vitals BP Pulse Temp Resp Height(growth percentile) Weight(growth percentile)  129/72 (BP 1 Location: Right arm, BP Patient Position: Sitting) 82 96.9 °F (36.1 °C) (Oral) 18 5' 4\" (1.626 m) 224 lb (101.6 kg) SpO2 BMI OB Status Smoking Status 95% 38.45 kg/m2 Hysterectomy Heavy Tobacco Smoker BMI and BSA Data Body Mass Index Body Surface Area  
 38.45 kg/m 2 2.14 m 2 Preferred Pharmacy Pharmacy Name Phone [address] [phone] Your Updated Medication List  
  
   
This list is accurate as of 7/11/18 12:51 PM.  Always use your most recent med list.  
  
  
  
  
 * albuterol 2.5 mg /3 mL (0.083 %) nebulizer solution Commonly known as:  PROVENTIL VENTOLIN  
3 mL by Nebulization route every four (4) hours as needed for Wheezing. * albuterol 90 mcg/actuation inhaler Commonly known as:  PROVENTIL HFA, VENTOLIN HFA, PROAIR HFA Take 2 Puffs by inhalation every six (6) hours as needed for Wheezing. CALCIUM+D 400-133.3 mg-unit Tab Generic drug:  calcium-cholecalciferol (d3) Take  by mouth.  
  
 escitalopram oxalate 10 mg tablet Commonly known as:  Nazia Orlando Take 10 mg by mouth daily. lisinopril 5 mg tablet Commonly known as:  Wendi Lopez Take 1 Tab by mouth daily. Indications: Diabetic Nephropathy, hypertension  
  
 metFORMIN  mg tablet Commonly known as:  GLUCOPHAGE XR Take 1 Tab by mouth daily. Indications: type 2 diabetes mellitus  
  
 tiZANidine 4 mg tablet Commonly known as:  Alma Eloy One tab twice daily prn muscle tension * Notice: This list has 2 medication(s) that are the same as other medications prescribed for you. Read the directions carefully, and ask your doctor or other care provider to review them with you. To-Do List   
 07/11/2018 ECHO:  ECHO TTE STRESS COMP W OR WO CONTR   
  
 07/11/2018 ECHO:  ECHO TTE STRESS EXERCISE TREADMILL COMP   
  
 07/11/2018   Imaging:  XR ANKLE LT MIN 3 V   
  
 07/13/2018 7:30 AM  
 Appointment with Randal Romero PTA at [address]  ([phone])  
  
 07/18/2018 7:00 AM  
  Appointment with Randal Romero PTA at [address] IM ([phone])  
  
 07/20/2018 7:30 AM  
  Appointment with Randal Romero PTA at [address] [facility] ([phone])  
  
 07/23/2018 7:30 AM  
  Appointment with Randal Romero PTA at [address] [place] [facility] ([phone])  
  
 07/27/2018 7:30 AM  
  Appointment with Randal Romero PTA at [address] [facility] ([phone])  
  
 07/30/2018 7:30 AM  
  Appointment with Randal Romero PTA at [address] [facility] ([phone]) [facility] SERVICES! Dear Katie Holman: Thank you for requesting a [facility] account. Our records indicate that you already have an active [facility] account. You can access your account anytime at [email or web address] Did you know that you can access your hospital and ER discharge instructions at any time in [facility] You can also review all of your test results from your hospital stay or ER visit. Additional Information If you have questions, please visit the Frequently Asked Questions section of the [facility] website at [email or web address] Remember, [facility] is NOT to be used for urgent needs. For medical emergencies, dial 911. Now available from your iPhone and Android! Please provide this summary of care documentation to your next provider. Your primary care clinician is listed as Romy Wilson. If you have any questions after today's visit, please call [phone].

## 2018-07-11 NOTE — PROGRESS NOTES
Christen Mir is a 60 y.o. female (: 1960) presenting to address:    Chief Complaint   Patient presents with   [address] [place] [address] ED Follow-up     18 ER [facility] for Tachycardia       Vitals:    18 1057   BP: 129/72   Pulse: 82   Resp: 18   Temp: 96.9 °F (36.1 °C)   TempSrc: Oral   SpO2: 95%   Weight: 224 lb (101.6 kg)   Height: 5' 4\" (1.626 m)   PainSc:   0 - No pain       Hearing/Vision:   No exam data present    Learning Assessment:     Learning Assessment 3/31/2015   PRIMARY LEARNER Patient   HIGHEST LEVEL OF EDUCATION - PRIMARY LEARNER  2 YEARS OF COLLEGE   BARRIERS PRIMARY LEARNER NONE   CO-LEARNER CAREGIVER No   PRIMARY LANGUAGE ENGLISH   LEARNER PREFERENCE PRIMARY DEMONSTRATION   ANSWERED BY self   RELATIONSHIP SELF     Depression Screening:     PHQ over the last two weeks 2018   PHQ Not Done Active Diagnosis of Depression or Bipolar Disorder   Little interest or pleasure in doing things -   Feeling down, depressed or hopeless -   Total Score PHQ 2 -     Fall Risk Assessment:     Fall Risk Assessment, last 12 mths 2018   Able to walk? Yes   Fall in past 12 months? No     Abuse Screening:     Abuse Screening Questionnaire 2018   Do you ever feel afraid of your partner? N   Are you in a relationship with someone who physically or mentally threatens you? N   Is it safe for you to go home? Y     Coordination of Care Questionaire:   1. Have you been to the ER, urgent care clinic since your last visit? Hospitalized since your last visit? YES, 18 ER [facility] for Tachycardia    2. Have you seen or consulted any other health care providers outside of the [address] [place] [address] since your last visit? Include any pap smears or colon screening.  NO

## 2018-07-13 ENCOUNTER — APPOINTMENT (OUTPATIENT)
Dept: PHYSICAL THERAPY | Age: 58
End: 2018-07-13
Payer: COMMERCIAL

## 2018-07-13 ENCOUNTER — OFFICE VISIT (OUTPATIENT)
Dept: FAMILY MEDICINE CLINIC | Age: 58
End: 2018-07-13

## 2018-07-13 VITALS
OXYGEN SATURATION: 96 % | RESPIRATION RATE: 18 BRPM | HEIGHT: 64 IN | BODY MASS INDEX: 38.76 KG/M2 | SYSTOLIC BLOOD PRESSURE: 141 MMHG | DIASTOLIC BLOOD PRESSURE: 74 MMHG | HEART RATE: 95 BPM | TEMPERATURE: 97.4 F | WEIGHT: 227 LBS

## 2018-07-13 DIAGNOSIS — R91.8 ABNORMAL CT SCAN, LUNG: ICD-10-CM

## 2018-07-13 DIAGNOSIS — E78.49 OTHER HYPERLIPIDEMIA: ICD-10-CM

## 2018-07-13 DIAGNOSIS — S93.402D SPRAIN OF LEFT ANKLE, SUBSEQUENT ENCOUNTER: Primary | ICD-10-CM

## 2018-07-13 RX ORDER — BUDESONIDE AND FORMOTEROL FUMARATE DIHYDRATE 80; 4.5 UG/1; UG/1
2 AEROSOL RESPIRATORY (INHALATION) 2 TIMES DAILY
Qty: 3 INHALER | Refills: 1 | Status: SHIPPED | OUTPATIENT
Start: 2018-07-13 | End: 2018-10-11

## 2018-07-13 RX ORDER — FENOFIBRATE 48 MG/1
48 TABLET, COATED ORAL DAILY
Qty: 90 TAB | Refills: 0 | Status: SHIPPED | OUTPATIENT
Start: 2018-07-13

## 2018-07-13 NOTE — PROGRESS NOTES
Levar Peralta is a 60 y.o. female presents in office to be seen for left ankle swelling. Health Maintenance Due   Topic Date Due    Hepatitis C Screening  1960    Pneumococcal 19-64 Medium Risk (1 of 1 - PPSV23) 01/16/1978    BREAST CANCER SCRN MAMMOGRAM  09/26/2016    COLON CANCER SCRN (BARIUM / CT COLO / FLX SIG) Q5  01/01/2017    MICROALBUMIN Q1  06/08/2018    FOOT EXAM Q1  06/16/2018       1. Have you been to the ER, urgent care clinic since your last visit? Hospitalized since your last visit?no    2. Have you seen or consulted any other health care providers outside of the [address] [place] [address] since your last visit? Include any pap smears or colon screening.  no

## 2018-07-13 NOTE — MR AVS SNAPSHOT
[address] [place] [address] [address] [facility] [address] 
[phone] Patient: Anna  MRN: [id number] SMS:9/22/7799 Visit Information Date & Time Provider Department Dept. Phone Encounter #  
 7/13/2018 10:15 AM [facility] [employer] [employer] [phone] [id number] Upcoming Health Maintenance Date Due Hepatitis C Screening 1960 Pneumococcal 19-64 Medium Risk (1 of 1 - PPSV23) 1/16/1978 BREAST CANCER SCRN MAMMOGRAM 9/26/2016 COLON CANCER SCRN (BARIUM / CT COLO / FLX SIG) Q5 1/1/2017 MICROALBUMIN Q1 6/8/2018 FOOT EXAM Q1 6/16/2018 Influenza Age 5 to Adult 8/1/2018 EYE EXAM RETINAL OR DILATED Q1 11/3/2018 HEMOGLOBIN A1C Q6M 11/17/2018 LIPID PANEL Q1 7/6/2019 DTaP/Tdap/Td series (2 - Td) 12/9/2026 Allergies as of 7/13/2018  Review Complete On: 7/13/2018 By: Jeanne Johnson LPN Severity Noted Reaction Type Reactions Animal Dander  12/23/2009    Other (comments) asthma Keflex [Cephalexin]  03/31/2015    Hives Mold Extracts  12/23/2009    Other (comments) asthma Other Plant, Animal, Environmental  12/28/2009    Other (comments)  
 blisters Percocet [Oxycodone-acetaminophen]  03/31/2015    Hives Pollen Extracts  12/23/2009    Other (comments) asthma Current Immunizations  Never Reviewed Name Date Influenza Vaccine 10/2/2015  8:39 AM  
 Influenza Vaccine (Quad) PF 12/9/2016 Tdap 12/9/2016 Not reviewed this visit You Were Diagnosed With   
  
 Codes Comments Sprain of left ankle, subsequent encounter    -  Primary ICD-10-CM: I52.728U ICD-9-CM: V58.89, 845.00 Other hyperlipidemia     ICD-10-CM: E78.4 ICD-9-CM: 272.4 Vitals BP Pulse Temp Resp Height(growth percentile) Weight(growth percentile) 141/74 95 97.4 °F (36.3 °C) (Oral) 18 5' 4.02\" (1.626 m) 227 lb (103 kg) SpO2 BMI OB Status Smoking Status 96% 38.94 kg/m2 Hysterectomy Heavy Tobacco Smoker BMI and BSA Data Body Mass Index Body Surface Area 38.94 kg/m 2 2.16 m 2 Preferred Pharmacy Pharmacy Name Phone [address] [phone] Your Updated Medication List  
  
   
This list is accurate as of 7/13/18 10:49 AM.  Always use your most recent med list.  
  
  
  
  
 * albuterol 2.5 mg /3 mL (0.083 %) nebulizer solution Commonly known as:  PROVENTIL VENTOLIN  
3 mL by Nebulization route every four (4) hours as needed for Wheezing. * albuterol 90 mcg/actuation inhaler Commonly known as:  PROVENTIL HFA, VENTOLIN HFA, PROAIR HFA Take 2 Puffs by inhalation every six (6) hours as needed for Wheezing. CALCIUM+D 400-133.3 mg-unit Tab Generic drug:  calcium-cholecalciferol (d3) Take  by mouth.  
  
 escitalopram oxalate 10 mg tablet Commonly known as:  Rojas Mcintosh Take 10 mg by mouth daily. fenofibrate nanocrystallized 48 mg tablet Commonly known as:  [employer] Take 1 Tab by mouth daily. lisinopril 5 mg tablet Commonly known as:  Duke Karie Take 1 Tab by mouth daily. Indications: Diabetic Nephropathy, hypertension  
  
 metFORMIN  mg tablet Commonly known as:  GLUCOPHAGE XR Take 1 Tab by mouth daily. Indications: type 2 diabetes mellitus  
  
 tiZANidine 4 mg tablet Commonly known as:  Mar Marnie One tab twice daily prn muscle tension * Notice: This list has 2 medication(s) that are the same as other medications prescribed for you. Read the directions carefully, and ask your doctor or other care provider to review them with you. Prescriptions Sent to Pharmacy Refills  
 fenofibrate nanocrystallized (TRICOR) 48 mg tablet 0 Sig: Take 1 Tab by mouth daily.   
 Class: Normal  
 Pharmacy: [address] [facility] Ph #: [phone] Route: Oral  
  
We Performed the Following REFERRAL TO ORTHOPEDICS [JQQ651 Custom] Comments:  
 Left ankle injury two days ago. Xray showed possible avulsion fx of fibula. To-Do List   
 07/18/2018 7:00 AM  
  Appointment with Erik Salinas PTA at [address] IM ([phone])  
  
 07/20/2018 7:30 AM  
  Appointment with Erik Salinas PTA at [address] IM ([phone])  
  
 07/23/2018 7:30 AM  
  Appointment with Erik Salinas PTA at [address] IM ([phone])  
  
 07/27/2018 7:30 AM  
  Appointment with Erik Salinas PTA at [address] IM ([phone])  
  
 07/30/2018 7:30 AM  
  Appointment with Erik Salinas PTA at [address]  ([phone])  
  
 08/02/2018 8:30 AM  
  Appointment with [facility] [address] [facility] CV SERV at [facility] NON-INVASIVE CARD ([phone]) Weight limit:  400 lbs  Please DO NOT eat or drink anything four (4) hours prior to this study. If it is absolutely necessary, you may have a light breakfast two (2) hours prior consisting of toast and juice only. NO caffeinated or decaffeinated liquid or food products and NO smoking from midnight the night before. Do not take BETA BLOCKER MEDICATION the day before and the morning of the appt. The patient may take prescribed medications prior to study unless otherwise instructed by patient's physician. Bring medications or a list of medications to this appointment. Patient will be walking/running on a Treadmill. Patient should wear closed toe shoes such as. tennis shoes that are suitable for walking (NO Sandals/Flip Flops, High Heels, clogs/crocks, etc.) & comfortable clothing. Physician Order may be given to the patient to bring to appointment or faxed to Central Scheduling at [phone].   Please have the patient arrive 15 minutes prior to their schedule appointment time and report to Patient Registration at the main entrance of the hospital.   AGE LIMIT for this procedure at [facility] is 25years old. All patients under 25years of age should be referred to [facility]. Referral Information Referral ID Referred By Referred To  
  
 [id number] Freda Tomlin Not Available Visits Status Start Date End Date 1 New Request 7/13/18 7/13/19 If your referral has a status of pending review or denied, additional information will be sent to support the outcome of this decision. Introducing [facility] SERVICES! Dear Anika Guerrier: Thank you for requesting a [employer] account. Our records indicate that you already have an active [employer] account. You can access your account anytime at [email or web address] Did you know that you can access your hospital and ER discharge instructions at any time in [employer]? You can also review all of your test results from your hospital stay or ER visit. Additional Information If you have questions, please visit the Frequently Asked Questions section of the [employer] website at [email or web address] Remember, [employer] is NOT to be used for urgent needs. For medical emergencies, dial 911. Now available from your iPhone and Android! Please provide this summary of care documentation to your next provider. Your primary care clinician is listed as Benny Sin. If you have any questions after today's visit, please call [phone].

## 2018-07-18 ENCOUNTER — APPOINTMENT (OUTPATIENT)
Dept: PHYSICAL THERAPY | Age: 58
End: 2018-07-18
Payer: COMMERCIAL

## 2018-07-20 ENCOUNTER — APPOINTMENT (OUTPATIENT)
Dept: PHYSICAL THERAPY | Age: 58
End: 2018-07-20
Payer: COMMERCIAL

## 2018-07-20 ENCOUNTER — TELEPHONE (OUTPATIENT)
Dept: NEUROLOGY | Age: 58
End: 2018-07-20

## 2018-07-20 NOTE — TELEPHONE ENCOUNTER
Need to speak to Sunni Rogers her result on my chart. No one has not call her back from the specialist .(ortho/pulmary) she has concerns.

## 2018-07-23 ENCOUNTER — APPOINTMENT (OUTPATIENT)
Dept: PHYSICAL THERAPY | Age: 58
End: 2018-07-23
Payer: COMMERCIAL

## 2018-07-23 NOTE — TELEPHONE ENCOUNTER
As already discussed with the patient, there was no definite fracture seen by radiology. There is a small irregularity at the tip of the fibula that could indicate an avulsion fracture. This means that when the ankle inverted (turned in), that the tendon could have pulled hard and abrupt enough to pull the tip of the bone away. Therefore, she needs to follow up with radiology for additional xray's once the swelling has lessened as well as management for the injury.

## 2018-07-23 NOTE — TELEPHONE ENCOUNTER
Patient has been called and notified of results. Verbal understanding given. No further questions at this time. Closing encounter.

## 2018-07-23 NOTE — PROGRESS NOTES
HISTORY OF PRESENT ILLNESS  Anali Don is a 60 y.o. female who presents for follow up after going to the ED 2 days ago because of having an elevated heart rate and feeling lightheaded. She was squatting on the ground when she first felt the symptoms. She states she has had similar episodes over the last year but usually last less time and milder. she has a home pulse ox monitor which showed the elevated heart rate and the oxygen was lower than normal.  She experienced mild chest discomfort as well that did not radiate. That has resolved as well as the lightheadedness and palpitations. She denies any head or neck injury. She has had no medication changes or missed any medications. She was at pain management today getting a left hip injection for bursitis and right after the injection the top of her left foot felt numb. She states she told the physician and he did not comment about it or seem concerned. She asked him if she would be able to run errands and such today and he told her she could. She stood up from the bed and did not realize her left foot was completely numb and her left ankle abruptly inverted and she fell onto the floor. She has had lateral ankle swelling since then and can hardly put any pressure on the foot because of it being numb. Mild throbbing pain. The pain management physician told her to follow up with her PCP and get an xray. Palpitations    The history is provided by the patient. This is a new problem. The current episode started 2 days ago. The problem has been resolved. The problem is associated with nothing. Associated symptoms include chest pain (mild). Pertinent negatives include no diaphoresis, no malaise/fatigue, no numbness, no chest pressure, no exertional chest pressure, no abdominal pain, no nausea, no headaches, no dizziness, no weakness and no shortness of breath. Risk factors include hypertension and stress. She has tried bed rest for the symptoms.  The treatment provided no relief. Her past medical history is significant for hypertension. Review of Systems   Constitutional: Negative for diaphoresis and malaise/fatigue. Eyes: Negative for blurred vision and photophobia. Respiratory: Negative for shortness of breath. Cardiovascular: Positive for chest pain (mild) and palpitations. Negative for leg swelling. Gastrointestinal: Negative for abdominal pain and nausea. Musculoskeletal: Negative for myalgias. Left ankle pain/swelling   Neurological: Positive for sensory change (left ankle and foot). Negative for dizziness, weakness, numbness and headaches. Physical Exam   Constitutional: She is oriented to person, place, and time. No distress. Eyes: EOM are normal. Pupils are equal, round, and reactive to light. Neck: No JVD present. Cardiovascular: Normal rate, regular rhythm, normal heart sounds and intact distal pulses. Pulmonary/Chest: Effort normal and breath sounds normal.   Musculoskeletal:   Moderate swelling of lateral left ankle over the malleolus. Mild surrounding swelling. Discomfort to palpation on lower aspect of malleolus. Very limited ROM    Neurological: She is alert and oriented to person, place, and time. Faint sensation to touch left foot and ankle       ASSESSMENT and PLAN    ICD-10-CM ICD-9-CM    1. Heart palpitations R00.2 785.1 ECHO TTE STRESS EXERCISE TREADMILL COMP      ECHO TTE STRESS COMP W OR WO CONTR   2.  Injury of left ankle, initial encounter S99.912A 959.7 XR ANKLE LT MIN 3 V

## 2018-07-24 NOTE — PROGRESS NOTES
HISTORY OF PRESENT ILLNESS  Sandra Rice is a 60 y.o. female  HPI    ROS    Physical Exam    ASSESSMENT and PLAN    ICD-10-CM ICD-9-CM    1. Sprain of left ankle, subsequent encounter S93.402D V58.89 REFERRAL TO ORTHOPEDICS     845.00    2. Other hyperlipidemia E78.4 272.4    3.  Abnormal CT scan, lung R91.8 793.19 REFERRAL TO PULMONARY DISEASE

## 2018-07-27 ENCOUNTER — OFFICE VISIT (OUTPATIENT)
Dept: FAMILY MEDICINE CLINIC | Age: 58
End: 2018-07-27

## 2018-07-27 ENCOUNTER — APPOINTMENT (OUTPATIENT)
Dept: PHYSICAL THERAPY | Age: 58
End: 2018-07-27
Payer: COMMERCIAL

## 2018-07-27 VITALS
BODY MASS INDEX: 38.76 KG/M2 | SYSTOLIC BLOOD PRESSURE: 102 MMHG | DIASTOLIC BLOOD PRESSURE: 58 MMHG | RESPIRATION RATE: 16 BRPM | HEIGHT: 64 IN | OXYGEN SATURATION: 98 % | WEIGHT: 227 LBS | TEMPERATURE: 97 F | HEART RATE: 80 BPM

## 2018-07-27 DIAGNOSIS — S93.402D SPRAIN OF LEFT ANKLE, UNSPECIFIED LIGAMENT, SUBSEQUENT ENCOUNTER: Primary | ICD-10-CM

## 2018-07-27 NOTE — PROGRESS NOTES
Nate Newman is a 60 y.o. female presents in office to be seen f/u for ankle pain. Health Maintenance Due   Topic Date Due    Hepatitis C Screening  1960    Pneumococcal 19-64 Medium Risk (1 of 1 - PPSV23) 01/16/1978    BREAST CANCER SCRN MAMMOGRAM  09/26/2016    COLON CANCER SCRN (BARIUM / CT COLO / FLX SIG) Q5  01/01/2017    MICROALBUMIN Q1  06/08/2018    FOOT EXAM Q1  06/16/2018       1. Have you been to the ER, urgent care clinic since your last visit? Hospitalized since your last visit?no    2. Have you seen or consulted any other health care providers outside of the [address] [place] [address] since your last visit? Include any pap smears or colon screening.  no

## 2018-07-30 ENCOUNTER — APPOINTMENT (OUTPATIENT)
Dept: PHYSICAL THERAPY | Age: 58
End: 2018-07-30
Payer: COMMERCIAL

## 2018-07-30 DIAGNOSIS — S93.402D SPRAIN OF LIGAMENT OF LEFT ANKLE, SUBSEQUENT ENCOUNTER: Primary | ICD-10-CM

## 2018-08-02 ENCOUNTER — TELEPHONE (OUTPATIENT)
Dept: FAMILY MEDICINE CLINIC | Age: 58
End: 2018-08-02

## 2018-08-02 DIAGNOSIS — M25.572 LEFT ANKLE PAIN, UNSPECIFIED CHRONICITY: Primary | ICD-10-CM

## 2018-08-02 DIAGNOSIS — M25.552 LEFT HIP PAIN: ICD-10-CM

## 2018-08-02 NOTE — TELEPHONE ENCOUNTER
----- Message from Mark Gaucher, [place] sent at 8/2/2018  4:31 PM EDT -----  Please call patient to inform her that the radiology reading on the recent x-ray stated that there was not the same concern of a possible fracture. They read as no acute fracture and also noted significantly less swelling. Continue with wearing the ankle brace when standing or walking much, elevating it when possible, and if needed to take ibuprofen. Since this appears to be a sprain versus a fracture, it should heal within the next 2-3 weeks.

## 2018-08-02 NOTE — TELEPHONE ENCOUNTER
Spoke to pt and made aware of the message, verbalized understanding. Pt requesting PT for her ankle and hip. Still c/o ankle pain.  Thank you  Marion Sotomayor LPN

## 2018-08-08 ENCOUNTER — HOSPITAL ENCOUNTER (OUTPATIENT)
Dept: PHYSICAL THERAPY | Age: 58
Discharge: HOME | End: 2018-08-08
Payer: COMMERCIAL

## 2018-08-08 PROCEDURE — 97162 PT EVAL MOD COMPLEX 30 MIN: CPT

## 2018-08-08 PROCEDURE — 97110 THERAPEUTIC EXERCISES: CPT

## 2018-08-08 NOTE — PROGRESS NOTES
[address] PHYSICAL THERAPY  [address] [place] [address], [place] [address],[facility], [facility] - Phone: [phone]  Fax: [phone] / 0992 [facility]  Patient Name: Riya Seymour : 1960   Medical   Diagnosis: Left hip pain [M25.552] Treatment Diagnosis: Left hip pain [M25.552]   Onset Date: 18     Referral Source: JESSICA Schwartz Start of Care [facility]): 2018   Prior Hospitalization: See medical history Provider #: [id number]   Prior Level of Function: Chronic hip pain (previous PT), Hx of ankle sprains   Comorbidities: DM, Arthritis, Asthma, Tobacco Use   Medications: Verified on Patient Summary List   The Plan of Care and following information is based on the information from the initial evaluation.   ==================================================================================  Assessment / martins information:   Riya Seymour is a 60 y.o.  yo female with Dx: Left hip pain [M25.552]. Pt was being seen in PT for chronic L hip pain. Pt was DC from PT and seen by pain management. Pt reports pain management MD gave her an injection in the L piriformis on 18. Pt reports she rolled her ankle leaving the appointment due to her L LE and ankle being numb. Pt reported falling twisting her ankle, and hitting her R knee. Pt had xrays the day of the injury and again 2 weeks later which were negative for fracture. Pt had increased pain, swelling, bruising around the lateral malleoli on the L ankle. Pt was unable to use AD's at home and has been amb with \"limping around\" gait pattern since the injury. Pt also reports no relief from piriformis injections on the L hip, with pain in the posterior glute, lateral thigh and HS's. Pt reports intermittent N/T in the L LE. Sensation intact.  Symmetrical pelvic alignment noted upon exam. Pt amb in the clinic with decreased ankle heel strike, decreased step through gait patter, as well as decreased L hip extension. TTP noted in the L piriformis, TFL, ITB, glute max, and med. Pt also TTP in the lateral malleoli on the L ankle and dorsum of the foot. Pt rates pain as 5/10 max, 2/10 at best.  Pain better with resting the ankle and stretching the hip, worse with standing and walking. Objective: FOTO score = 54 (an established functional score where 100 = no disability). AROM L hip flexion 90 degrees. L ankle DF 4 with pain, PF 15, IV 8, EV 4 with pain. PROM WNL in all directions in the L ankle. Strength grossly decreased to 3+/5 L ankle, and 4-/5 L hip and quad. Special Tests+ piriformis jump sign, + MARIE in the L hip. Functional limitations at this time include standing, walking, stair negotation. The patient was instructed in a home exercise program to address the above findings/deficits. The patient would benefit from skilled physical therapy at this time to address the above functional deficits.     ==================================================================================  Eval Complexity: History HIGH Complexity :3+ comorbidities / personal factors will impact the outcome/ POC ;  Examination  HIGH Complexity : 4+ Standardized tests and measures addressing body structure, function, activity limitation and / or participation in recreation ; Presentation MEDIUM Complexity : Evolving with changing characteristics ;   Decision Making MEDIUM Complexity : FOTO score of 26-74; Overall Complexity MEDIUM  Problem List: pain affecting function, decrease ROM, decrease strength, edema affecting function, impaired gait/ balance, decrease ADL/ functional abilitiies, decrease activity tolerance, decrease flexibility/ joint mobility and decrease transfer abilities   Treatment Plan may include any combination of the following: Therapeutic exercise, Therapeutic activities, Neuromuscular re-education, Physical agent/modality, Gait/balance training, Manual therapy, Patient education, Functional mobility training and Stair training  Patient / Family readiness to learn indicated by: asking questions, trying to perform skills and interest  Persons(s) to be included in education: patient (P)  Barriers to Learning/Limitations: None  Measures taken:    Patient Goal (s): \"get movement back, reduce stiffness and pain\"   Patient self reported health status: fair  Rehabilitation Potential: good   Short Term Goals: To be accomplished in  6  treatments: Independent/compliant with long term HEP for ROM, flexibility and strength  Improve active dorsiflexion by 5 degrees to improve/normalize gait cycle     Long Term Goals: To be accomplished in  12  treatments:  1. Patient will demo 4+/5 hip ABD strength for decreased gait deviations  2. Patient will report 75% improvement in overall improvement in functional ADL's for ease with daily activities   3. Improve FOTO outcome score to 60/100  in order to indicate a significant improvement in ADL function. 4. Patient will be independent with long term HEP in order to prepare for DC to home. 5. Pt will be able to amb 500 ft with no antalgic gait pattern in order to perform community amb and return to PLOF. Frequency / Duration:   Patient to be seen  2  times per week for 12  treatments:  Patient / Caregiver education and instruction: exercises  G-Codes (GP): NA  Therapist Signature: Anamaria Yeager PT, DPT   Date: 1/0/9528   Certification Period: NA Time: 4:46 PM   ==================================================================================  I certify that the above Physical Therapy Services are being furnished while the patient is under my care. I agree with the treatment plan and certify that this therapy is necessary. Physician Signature:        Date:       Time:     Please sign and return to [facility], [facility]. or you may fax the signed copy to [phone]. Thank you.

## 2018-08-08 NOTE — PROGRESS NOTES
PHYSICAL THERAPY - DAILY TREATMENT NOTE    Patient Name: Lor Whipple        Date: 2018  : 1960   yes Patient  Verified  Visit #:     Insurance: Payor: Ernesto Jones / Plan: [address] PT / Product Type: Commerical /      In time: 400 Out time: 445   Total Treatment Time: 45     Medicare/BCBS Time Tracking (below)   Total Timed Codes (min):  na 1:1 Treatment Time:  na     TREATMENT AREA =  Left hip pain [M25.552]    SUBJECTIVE  Pain Level (on 0 to 10 scale):  3  / 10   Medication Changes/New allergies or changes in medical history, any new surgeries or procedures?    no  If yes, update Summary List   Subjective Functional Status/Changes:  []  No changes reported     SEE POC         OBJECTIVE    10 min Therapeutic Exercise:  [x]  See flow sheet   Rationale:      increase ROM to improve the patients ability to perform functional ADL's      min Patient Education:  yes  Established HEP   []  Progressed/Changed HEP based on: Other Objective/Functional Measures:    SEE POC     Post Treatment Pain Level (on 0 to 10) scale:   3  / 10     ASSESSMENT  Assessment/Changes in Function:     Evaluation Code Complexity: History HIGH Complexity :3+ comorbidities / personal factors will impact the outcome/ POC ; Examination HIGH Complexity : 4+ Standardized tests and measures addressing body structure, function, activity limitation and / or participation in recreation ; Presentation MEDIUM Complexity : Evolving with changing characteristics ; Decision Making MEDIUM Complexity : FOTO score of 26-74;  Complexity MEDIUM    Justification for Eval Code Complexity:  Patient History : DM, asthma, arthritis, tobacco use  Examination SEE ABOVE EXAM  Clinical Presentation: evolving pain  Clinical Decision Making : DRGT 46         []  See Progress Note/Recertification   Patient will continue to benefit from skilled PT services to modify and progress therapeutic interventions, address functional mobility deficits, address ROM deficits, address strength deficits, analyze and address soft tissue restrictions, analyze and cue movement patterns and analyze and modify body mechanics/ergonomics to attain remaining goals. Progress toward goals / Updated goals:    NA     PLAN  []  Upgrade activities as tolerated yes Continue plan of care   []  Discharge due to :    [x]  Other: Pt will be seen 2 times per week for 12 sessions.       Therapist: Marietta Triana PT, DPT    Date: 8/8/2018 Time: 5:04 PM     Future Appointments  Date Time Provider Alanis Childs   8/10/2018 12:00 PM Alvin Ceballos, PT [facility]   8/15/2018 7:00 AM Gaston Lawler PTA [facility]   8/17/2018 11:30 AM Alvin Ceballos, JUANITA [facility]   8/20/2018 8:00 AM Gaston Lawler PTA [facility]   8/22/2018 4:30 PM Thais Vitale PTA [facility]   8/27/2018 7:30 AM [facility] [address] [facility] SERV [facility]   8/29/2018 7:30 AM Gaston Lawler PTA [facility]   8/31/2018 3:00 PM Thais Vitale [facility]   9/4/2018 7:00 AM [address] [place] [address], PT [facility]   9/7/2018 7:30 AM [address] [place] [address], PT [facility]

## 2018-08-10 ENCOUNTER — HOSPITAL ENCOUNTER (OUTPATIENT)
Dept: PHYSICAL THERAPY | Age: 58
Discharge: HOME | End: 2018-08-10
Payer: COMMERCIAL

## 2018-08-10 PROCEDURE — 97140 MANUAL THERAPY 1/> REGIONS: CPT

## 2018-08-10 PROCEDURE — 97110 THERAPEUTIC EXERCISES: CPT

## 2018-08-10 NOTE — PROGRESS NOTES
PHYSICAL THERAPY - DAILY TREATMENT NOTE    Patient Name: Christen Mir        Date: 8/10/2018  : 1960   yes Patient  Verified  Visit #:     Insurance: [facility] / Plan: [address] [place] [address] Rd PT / Product Type: Commerical /      In time: 1200 Out time: 100   Total Treatment Time: 60     Medicare/BCBS Time Tracking (below)   Total Timed Codes (min):  na 1:1 Treatment Time:  na     TREATMENT AREA =  Left hip pain [M25.552]    SUBJECTIVE  Pain Level (on 0 to 10 scale):  2  / 10   Medication Changes/New allergies or changes in medical history, any new surgeries or procedures?    no  If yes, update Summary List   Subjective Functional Status/Changes:  []  No changes reported     \"I feel okay, a little sore in the hip and ankle, but not bad\"         OBJECTIVE  Modalities Rationale:     decrease inflammation and decrease pain to improve patient's ability to perform functional ADL's   min [] Estim, type/location:                                      []  att     []  unatt     []  w/US     []  w/ice    []  w/heat    min []  Mechanical Traction: type/lbs                   []  pro   []  sup   []  int   []  cont    []  before manual    []  after manual    min []  Ultrasound, settings/location:      min []  Iontophoresis w/ dexamethasone, location:                                               []  take home patch       []  in clinic   10 min []  Ice     [x]  Heat    location/position: Prone to L/S, L hip    min []  Vasopneumatic Device, press/temp:     min []  Other:    [] Skin assessment post-treatment (if applicable):    []  intact    []  redness- no adverse reaction     []redness  adverse reaction:        40 min Therapeutic Exercise:  [x]  See flow sheet   Rationale:      increase ROM and increase strength to improve the patients ability to perform functional ADL's     10 min Manual Therapy: STM/TPR to the L pirifomris, glute med and ITB   Rationale:      decrease pain, increase ROM and increase tissue extensibility to improve patient's ability to perform functional ADL's     min Patient Education:  yes  Reviewed HEP   []  Progressed/Changed HEP based on: Other Objective/Functional Measures:    Initiated therex today per flow sheet, requiring vc and demo 100% of the time for proper form and technique. Post Treatment Pain Level (on 0 to 10) scale:   1  / 10     ASSESSMENT  Assessment/Changes in Function:     Tolerate therex fair, moderate pain and fatigue noted with clams. Educated on DOMS- verbalized understanding. Will continue to progress therex within current POC as patient is able. []  See Progress Note/Recertification   Patient will continue to benefit from skilled PT services to modify and progress therapeutic interventions, address functional mobility deficits, address ROM deficits, address strength deficits, analyze and address soft tissue restrictions, analyze and cue movement patterns and analyze and modify body mechanics/ergonomics to attain remaining goals.    Progress toward goals / Updated goals:    First visit since IE     PLAN  []  Upgrade activities as tolerated yes Continue plan of care   []  Discharge due to :    []  Other:      Therapist: Johnathan Rodriguez PT, DPT    Date: 8/10/2018 Time: 1200     Future Appointments  Date Time Provider Alanis Childs   8/10/2018 12:00 PM Violette Ceballos, JUANITA [facility]   8/15/2018 7:00 AM Cher Moran PTA [facility]   8/17/2018 11:30 AM Violette Ceballos PT [address] [facility]   8/20/2018 8:00 AM Cher Moran PTA [facility]   8/22/2018 4:30 PM Padmini Hannon PTA [facility]   8/27/2018 7:30 AM [facility] [address] [facility]   8/29/2018 7:30 AM Cher Moran [facility]   8/31/2018 3:00 PM Padmini Hannon [facility]   9/4/2018 7:00 AM [address] [facility], PT [facility]   9/7/2018 7:30 AM [address] [facility], PT [facility]

## 2018-08-11 NOTE — PROGRESS NOTES
History of Present Illness  This is a 65-year-old female who presents for follow-up of left ankle pain from an injury. He has been elevating it when possible on a daily basis as well as applying cool compress to it. She is also taking ibuprofen. There is improvement regarding the pain and the swelling has gone down considerably. She is requesting that an x-ray be done since the initial x-ray was done when she had a fair amount of swelling and there was no certainty whether there is a fracture present. Past Medical History:   Diagnosis Date    Asthma     Diabetes ([facility] [address]     Hypercholesterolemia      Current Outpatient Prescriptions on File Prior to Visit   Medication Sig Dispense Refill    fenofibrate nanocrystallized (TRICOR) 48 mg tablet Take 1 Tab by mouth daily. 90 Tab 0    budesonide-formoterol (SYMBICORT) 80-4.5 mcg/actuation HFAA Take 2 Puffs by inhalation two (2) times a day for 90 days. 3 Inhaler 1    escitalopram oxalate (LEXAPRO) 10 mg tablet Take 10 mg by mouth daily.  lisinopril (PRINIVIL, ZESTRIL) 5 mg tablet Take 1 Tab by mouth daily. Indications: Diabetic Nephropathy, hypertension 90 Tab 1    metFORMIN ER (GLUCOPHAGE XR) 500 mg tablet Take 1 Tab by mouth daily. Indications: type 2 diabetes mellitus 90 Tab 1    albuterol (PROVENTIL HFA, VENTOLIN HFA, PROAIR HFA) 90 mcg/actuation inhaler Take 2 Puffs by inhalation every six (6) hours as needed for Wheezing. 1 Inhaler 0    tiZANidine (ZANAFLEX) 4 mg tablet One tab twice daily prn muscle tension 14 Tab 0    albuterol (PROVENTIL VENTOLIN) 2.5 mg /3 mL (0.083 %) nebulizer solution 3 mL by Nebulization route every four (4) hours as needed for Wheezing. 24 Each 0    calcium-cholecalciferol, d3, (CALCIUM+D) 400-133.3 mg-unit tab Take  by mouth. No current facility-administered medications on file prior to visit.         Review of Systems  Constitutional: No fever or chills  Musculoskeletal: Aching of the ankle at rest and mild to moderate pain with weightbearing  Neuro: Denies sensory disturbance    Objective  Visit Vitals    /58    Pulse 80    Temp 97 °F (36.1 °C) (Oral)    Resp 16    Ht 5' 4.02\" (1.626 m)    Wt 227 lb (103 kg)    SpO2 98%    BMI 38.94 kg/m2       Physical Exam  General: Well-appearing, no distress  Musculoskeletal: Mild swelling on the side of the left ankle with mild tenderness to palpation; increased range of motion present compared to the previous visit  Skin: No erythema or bruising  Neuro: Sensation fully intact    Diagnoses and all orders for this visit:    1. Sprain of left ankle, unspecified ligament, subsequent encounter  -     XR ANKLE LT MIN 3 V; Future    Review of the x-ray reveals no obvious fracture and also shows soft tissue swelling has decreased advised her to continue to elevate the ankle and possible as well as occasionally apply cool compress and to take ibuprofen.

## 2018-08-15 ENCOUNTER — HOSPITAL ENCOUNTER (OUTPATIENT)
Dept: PHYSICAL THERAPY | Age: 58
Discharge: HOME | End: 2018-08-15
Payer: COMMERCIAL

## 2018-08-15 PROCEDURE — 97140 MANUAL THERAPY 1/> REGIONS: CPT

## 2018-08-15 PROCEDURE — 97110 THERAPEUTIC EXERCISES: CPT

## 2018-08-15 NOTE — PROGRESS NOTES
PHYSICAL THERAPY - DAILY TREATMENT NOTE    Patient Name: Adrianne Youssef        Date: 8/15/2018  : 1960   YES Patient  Verified  Visit #:   3   of   12  Insurance: Payor: Oscar Julio / Plan: [address] PT / Product Type: Commerical /      In time: 700 Out time: 800   Total Treatment Time: 60     Medicare Time Tracking (below)/BCBS   Total Timed Codes (min):   1:1 Treatment Time:       TREATMENT AREA = Left hip pain [M25.552]    SUBJECTIVE  Pain Level (on 0 to 10 scale):  2  / 10 hip   Medication Changes/New allergies or changes in medical history, any new surgeries or procedures? NO    If yes, update Summary List   Subjective Functional Status/Changes:  []  No changes reported     Most of the pain is in my hip. Not sure if the injection helped because my ankle has altered my walking.           OBJECTIVE  Modalities Rationale:     decrease inflammation and decrease pain to improve patient's ability to perform functional ADL's   min [] Estim, type/location:                                      []  att     []  unatt     []  w/US     []  w/ice    []  w/heat    min []  Mechanical Traction: type/lbs                   []  pro   []  sup   []  int   []  cont    []  before manual    []  after manual    min []  Ultrasound, settings/location:      min []  Iontophoresis w/ dexamethasone, location:                                               []  take home patch       []  in clinic   10 min [x]  Ice     []  Heat    location/position: Prone to L/S, L hip    min []  Vasopneumatic Device, press/temp:     min []  Other:    [x] Skin assessment post-treatment (if applicable):    [x]  intact    []  redness- no adverse reaction     []redness  adverse reaction:        35 min Therapeutic Exercise:  [x]  See flow sheet   Rationale:      increase ROM, increase strength, improve coordination and improve balance to improve the patients ability to perform functional ADL's    15 min Manual Therapy: STM/TPR to the L pirifomris, glute med and ITB   Rationale:      decrease pain, increase ROM and increase tissue extensibility to improve patient's ability to perform functional ADL's       min Patient Education:  YES  Reviewed HEP   []  Progressed/Changed HEP based on: Other Objective/Functional Measures:    TTP along ITB and (L) glute/piriformis. No change in functional measurements noted today. Post Treatment Pain Level (on 0 to 10) scale:   0  / 10     ASSESSMENT  Assessment/Changes in Function:     No change in function noted today. Continue with therx per flow sheet. []  See Progress Note/Recertification   Patient will continue to benefit from skilled PT services to modify and progress therapeutic interventions, address functional mobility deficits, address ROM deficits, address strength deficits, analyze and address soft tissue restrictions and analyze and cue movement patterns to attain remaining goals. Progress toward goals / Updated goals:    No change toward goals today.      PLAN  []  Upgrade activities as tolerated YES Continue plan of care   []  Discharge due to :    []  Other:      Therapist: CARY Mcadams    Date: 8/15/2018 Time: 6:38 AM       Future Appointments  Date Time Provider Alanis Childs   8/15/2018 7:00 AM Shruthi Nevarez PTA [facility]   8/17/2018 11:30 AM Ritika Ceballos, PT [address] [facility]   8/20/2018 8:00 AM Shruthi Nevarez PTA [facility]   8/22/2018 4:30 PM Sherley Guerra PTA [facility]   8/27/2018 7:30 AM [facility] SERV [facility]   8/29/2018 7:30 AM Shruthi Nevarez PTA [facility]   8/31/2018 3:00 PM Sherley Guerra PTA [facility]   9/4/2018 7:00 AM [address] [facility] PT [facility]   9/7/2018 7:30 AM [address] [place] [address], [facility]

## 2018-08-17 ENCOUNTER — HOSPITAL ENCOUNTER (OUTPATIENT)
Dept: PHYSICAL THERAPY | Age: 58
Discharge: HOME | End: 2018-08-17
Payer: COMMERCIAL

## 2018-08-17 PROCEDURE — 97110 THERAPEUTIC EXERCISES: CPT

## 2018-08-17 PROCEDURE — 97140 MANUAL THERAPY 1/> REGIONS: CPT

## 2018-08-17 NOTE — PROGRESS NOTES
PHYSICAL THERAPY - DAILY TREATMENT NOTE    Patient Name: Shara Bamberger        Date: 2018  : 1960   YES Patient  Verified  Visit #:     Insurance: Payor: Eric Medico / Plan: [address] [place] [address] Rd PT / Product Type: Commerical /      In time: 1130 Out time: 9869   Total Treatment Time: 50     Medicare Time Tracking (below)/BCBS   Total Timed Codes (min):  na 1:1 Treatment Time:  na     TREATMENT AREA = Left hip pain [M25.552]    SUBJECTIVE  Pain Level (on 0 to 10 scale): 4 / 10 hip   Medication Changes/New allergies or changes in medical history, any new surgeries or procedures? NO    If yes, update Summary List   Subjective Functional Status/Changes:  []  No changes reported     \"I feel like my hip is not getting better and its just frustrating.  I just want to be back to where I was before the injection\"         OBJECTIVE  Modalities Rationale:     decrease inflammation and decrease pain to improve patient's ability to perform functional ADL's   min [] Estim, type/location:                                      []  att     []  unatt     []  w/US     []  w/ice    []  w/heat    min []  Mechanical Traction: type/lbs                   []  pro   []  sup   []  int   []  cont    []  before manual    []  after manual    min []  Ultrasound, settings/location:      min []  Iontophoresis w/ dexamethasone, location:                                               []  take home patch       []  in clinic   10 min []  Ice     [x]  Heat    location/position: Prone to L/S, L hip    min []  Vasopneumatic Device, press/temp:     min []  Other:    [x] Skin assessment post-treatment (if applicable):    [x]  intact    []  redness- no adverse reaction     []redness  adverse reaction:        30 min Therapeutic Exercise:  [x]  See flow sheet   Rationale:      increase ROM, increase strength, improve coordination and improve balance to improve the patients ability to perform functional ADL's    10 min Manual Therapy: STM/TPR to the L pirifomris, glute med and ITB   Rationale:      decrease pain, increase ROM and increase tissue extensibility to improve patient's ability to perform functional ADL's       min Patient Education:  YES  Reviewed HEP   []  Progressed/Changed HEP based on: Other Objective/Functional Measures:    See therex per flow sheet  Pt denies any sharp pains or red flags following tx    Moderate TTP and pain in the L L/S para, glute med, and piriformis     Post Treatment Pain Level (on 0 to 10) scale:  1 / 10     ASSESSMENT  Assessment/Changes in Function:     Decreased pain following therapy today, however overall continues to demonstrate no changes towards goals/functional improvements at this time. Will continue to progress therex within current POC as patient is able. []  See Progress Note/Recertification   Patient will continue to benefit from skilled PT services to modify and progress therapeutic interventions, address functional mobility deficits, address ROM deficits, address strength deficits, analyze and address soft tissue restrictions and analyze and cue movement patterns to attain remaining goals. Progress toward goals / Updated goals:    No progress towards goals at this time.       PLAN  []  Upgrade activities as tolerated YES Continue plan of care   []  Discharge due to :    []  Other:      Therapist: No Tubsb, PT, DPT      Date: 8/17/2018 Time: 145PM       Future Appointments  Date Time Provider Alanis Childs   8/20/2018 8:00 AM Be Rocha PTA [facility]   8/22/2018 4:30 PM Froy Wilson PTA [facility]   8/27/2018 7:30 AM [facility] [address] [facility] SERV [facility]   8/29/2018 7:30 AM Be Rocha PTA [facility]   8/31/2018 3:00 PM Froy Wilson PTA [facility]   9/4/2018 7:00 AM [address] [place] [address], PT [facility]   9/7/2018 7:30 AM [address] [place] [address], PT [facility]

## 2018-08-20 ENCOUNTER — HOSPITAL ENCOUNTER (OUTPATIENT)
Dept: PHYSICAL THERAPY | Age: 58
Discharge: HOME | End: 2018-08-20
Payer: COMMERCIAL

## 2018-08-20 PROCEDURE — 97110 THERAPEUTIC EXERCISES: CPT

## 2018-08-20 PROCEDURE — 97140 MANUAL THERAPY 1/> REGIONS: CPT

## 2018-08-20 NOTE — PROGRESS NOTES
PHYSICAL THERAPY - DAILY TREATMENT NOTE    Patient Name: Lor Whipple        Date: 2018  : 1960   YES Patient  Verified  Visit #:     Insurance: Payor: Ernesto Jones / Plan: [address] PT / Product Type: Commerical /      In time: 800 Out time: 910   Total Treatment Time: 70     Medicare Time Tracking (below)/BCBS   Total Timed Codes (min):   1:1 Treatment Time:       TREATMENT AREA =  Left hip pain [M25.552]    SUBJECTIVE  Pain Level (on 0 to 10 scale):  1  / 10   Medication Changes/New allergies or changes in medical history, any new surgeries or procedures? NO    If yes, update Summary List   Subjective Functional Status/Changes:  []  No changes reported     My hip was fine over the weekend. i'm building a wagon for [employer].           OBJECTIVE  Modalities Rationale:     decrease inflammation and decrease pain to improve patient's ability to perform functional ADL's   min [] Estim, type/location:                                      []  att     []  unatt     []  w/US     []  w/ice    []  w/heat    min []  Mechanical Traction: type/lbs                   []  pro   []  sup   []  int   []  cont    []  before manual    []  after manual    min []  Ultrasound, settings/location:      min []  Iontophoresis w/ dexamethasone, location:                                               []  take home patch       []  in clinic   10 min [x]  Ice     []  Heat    location/position: Prone to L/S, L hip    min []  Vasopneumatic Device, press/temp:     min []  Other:    [x] Skin assessment post-treatment (if applicable):    [x]  intact    []  redness- no adverse reaction     []redness  adverse reaction:        45 min Therapeutic Exercise:  [x]  See flow sheet   Rationale:      increase ROM, increase strength, improve coordination and improve balance to improve the patients ability to perform functional ADL's     15 min Manual Therapy: STM/TPR to the L pirifomris, glute med and ITB   Rationale: decrease pain, increase ROM and increase tissue extensibility to improve patient's ability to perform functional ADL's     min Patient Education:  YES  Reviewed HEP   []  Progressed/Changed HEP based on: Other Objective/Functional Measures:     Increase TTP with deeper pressure to R piriformis. Post Treatment Pain Level (on 0 to 10) scale:   3  / 10     ASSESSMENT  Assessment/Changes in Function:     Continue with therx per flow sheet. Reports improved tolerance with walking and standing activities. []  See Progress Note/Recertification   Patient will continue to benefit from skilled PT services to modify and progress therapeutic interventions, address functional mobility deficits, address ROM deficits, address strength deficits, analyze and address soft tissue restrictions and analyze and cue movement patterns to attain remaining goals. Progress toward goals / Updated goals:    Progressing steadily with pain reduction and performance of functional mobility activities.      PLAN  []  Upgrade activities as tolerated YES Continue plan of care   []  Discharge due to :    []  Other:      Therapist: CARY Escobar    Date: 8/20/2018 Time: 6:49 AM       Future Appointments  Date Time Provider Alanis Childs   8/20/2018 8:00 AM Vince Chavez [facility]   8/22/2018 4:30 PM Estela Earl [facility]   8/27/2018 7:30 AM [facility] [facility] CV SERV [facility]   8/29/2018 7:30 AM Vince Chavez PTA [facility]   8/31/2018 3:00 PM Estela Earl [facility]   9/4/2018 7:00 AM [address] [place] [address], PT [facility]   9/7/2018 7:30 AM [address] [place] [address], PT [facility]

## 2018-08-21 ENCOUNTER — TELEPHONE (OUTPATIENT)
Dept: FAMILY MEDICINE CLINIC | Age: 58
End: 2018-08-21

## 2018-08-21 DIAGNOSIS — R00.2 PALPITATIONS: Primary | ICD-10-CM

## 2018-08-21 NOTE — TELEPHONE ENCOUNTER
Received a call from Ms. Candelaria Blue from [facility]. Requesting that the order for the Echo Stress Test to [id number]. Order pended please sign.

## 2018-08-22 ENCOUNTER — HOSPITAL ENCOUNTER (OUTPATIENT)
Dept: PHYSICAL THERAPY | Age: 58
Discharge: HOME | End: 2018-08-22
Payer: COMMERCIAL

## 2018-08-22 PROCEDURE — 97140 MANUAL THERAPY 1/> REGIONS: CPT

## 2018-08-22 PROCEDURE — 97110 THERAPEUTIC EXERCISES: CPT

## 2018-08-22 NOTE — PROGRESS NOTES
PHYSICAL THERAPY - DAILY TREATMENT NOTE    Patient Name: Adrianne Youssef        Date: 2018  : 1960   YES Patient  Verified  Visit #:      12  Insurance: Payor: Oscar Julio / [place] [employer] / Product Type: PPO /      In time: 425 Out time: 510   Total Treatment Time: 45     Medicare Time Tracking (below)   Total Timed Codes (min):  35 1:1 Treatment Time:       TREATMENT AREA =  Left hip pain [M25.552]    SUBJECTIVE  Pain Level (on 0 to 10 scale):  3  / 10   Medication Changes/New allergies or changes in medical history, any new surgeries or procedures? NO    If yes, update Summary List   Subjective Functional Status/Changes:  []  No changes reported     Pain primarily in the (L) hip and lower back. OBJECTIVE  Modalities Rationale:     decrease inflammation and decrease pain to improve patient's ability to perform pain free ADLs. min [] Estim, type/location:                                      []  att     []  unatt     []  w/US     []  w/ice    []  w/heat    min []  Mechanical Traction: type/lbs                   []  pro   []  sup   []  int   []  cont    []  before manual    []  after manual    min []  Ultrasound, settings/location:      min []  Iontophoresis w/ dexamethasone, location:                                               []  take home patch       []  in clinic   10 min []  Ice     [x]  Heat    location/position: Lumbar, (L) hip in prone. min []  Vasopneumatic Device, press/temp:     min []  Other:    [x] Skin assessment post-treatment (if applicable):    [x]  intact    []  redness- no adverse reaction     []redness  adverse reaction:        25 min Therapeutic Exercise:  [x]  See flow sheet   Rationale:      increase ROM, increase strength, improve coordination and improve balance to improve the patients ability to perform pain free ADLs. 10 Min Manual Therapy: STM/DTM and TPR (L) lumbar paraspinals, glute/piriformis.     Rationale:      decrease pain, increase ROM, increase tissue extensibility and decrease trigger points to improve patient's ability to perform pain free ADLs. min Patient Education:  YES  Reviewed HEP   []  Progressed/Changed HEP based on: Other Objective/Functional Measures: Therex per flow sheet. FOTO = 49   GROC = +4    Post Treatment Pain Level (on 0 to 10) scale:   2  / 10     ASSESSMENT  Assessment/Changes in Function:     TTP (L) glute/piriformis. TTP (L) lumbar paraspinals. []  See Progress Note/Recertification   Patient will continue to benefit from skilled PT services to modify and progress therapeutic interventions, address functional mobility deficits, address ROM deficits, address strength deficits, analyze and address soft tissue restrictions, analyze and cue movement patterns, analyze and modify body mechanics/ergonomics and assess and modify postural abnormalities to attain remaining goals. Progress toward goals / Updated goals:    No change in progress toward LTG's with today's session.        PLAN  [x]  Upgrade activities as tolerated YES Continue plan of care   []  Discharge due to :    []  Other:      Therapist: Shady Bates PTA    Date: 8/22/2018 Time: 4:34 PM     Future Appointments  Date Time Provider Alanis Childs   8/27/2018 7:30 AM [address] [place] [address]   8/29/2018 7:30 AM Thomas Venegas PTA [facility]   8/31/2018 3:00 PM Shady Bates PTA [facility]   9/4/2018 7:00 AM Galdino Alston PT [facility]   9/7/2018 7:30 AM Galdino Alston PT [address] [facility]

## 2018-08-29 ENCOUNTER — HOSPITAL ENCOUNTER (OUTPATIENT)
Dept: PHYSICAL THERAPY | Age: 58
Discharge: HOME | End: 2018-08-29
Payer: COMMERCIAL

## 2018-08-29 PROCEDURE — 97110 THERAPEUTIC EXERCISES: CPT

## 2018-08-29 PROCEDURE — 97140 MANUAL THERAPY 1/> REGIONS: CPT

## 2018-08-29 NOTE — PROGRESS NOTES
PHYSICAL THERAPY - DAILY TREATMENT NOTE Patient Name: Delphine Licea        Date: 2018 : 1960   YES Patient  Verified Visit #:     Insurance: Payor: Trent Cosby / [place] [employer] / Product Type: PPO / In time: 730 Out time: 835 Total Treatment Time: 72 Medicare Time Tracking (below)/BCBS Total Timed Codes (min):   1:1 Treatment Time:    
TREATMENT AREA =  Left hip pain [M25.552] SUBJECTIVE Pain Level (on 0 to 10 scale):  1  / 10 Medication Changes/New allergies or changes in medical history, any new surgeries or procedures? NO    If yes, update Summary List  
Subjective Functional Status/Changes:  []  No changes reported My pain level is down but I am really stiff in my hip today. I think I may have over done it. I worked on my wagon for about an hour on an off. But i'm not in the kind of pain that I was in before. i'm just really stiff though. OBJECTIVE Modalities Rationale:     decrease inflammation and decrease pain to improve patient's ability to perform functional ADL's 
 min [] Estim, type/location:   
                                 []  att     []  unatt     []  w/US     []  w/ice    []  w/heat 
 min []  Mechanical Traction: type/lbs   
               []  pro   []  sup   []  int   []  cont    []  before manual    []  after manual  
 min []  Ultrasound, settings/location:    
 min []  Iontophoresis w/ dexamethasone, location:   
                                           []  take home patch       []  in clinic  
10 min []  Ice     [x]  Heat    location/position: Lumbar, (L) hip in prone  
 min []  Vasopneumatic Device, press/temp:   
 min []  Other:   
[x] Skin assessment post-treatment (if applicable):   
[x]  intact    []  redness- no adverse reaction    
[]redness  adverse reaction:     
45 min Therapeutic Exercise:  [x]  See flow sheet Rationale:      increase ROM, increase strength, improve coordination and improve balance to improve the patients ability to perform functional ADL's 
10 min Manual Therapy: STM/TPR to the L pirifomris, glute med and ITB Rationale:      decrease pain, increase ROM and increase tissue extensibility to improve patient's ability toperform functional ADL's 
 min Patient Education:  YES  Reviewed HEP []  Progressed/Changed HEP based on: Other Objective/Functional Measures: 
 
Discussed use of TPR with yellow ball again. Patient verbalized understanding of how to use it, when to use it and how often. Patient to use ball prn for when she feels an increase of hip stiffness. Post Treatment Pain Level (on 0 to 10) scale:   1  / 10 ASSESSMENT Assessment/Changes in Function:  
 
Patient reported continued feeling of hip stiffness after manual and therx. Patient reports ~ 80% overall improvement with all functioanl mobility activities []  See Progress Note/Recertification Patient will continue to benefit from skilled PT services to modify and progress therapeutic interventions, address functional mobility deficits, address ROM deficits, address strength deficits, analyze and address soft tissue restrictions and analyze and cue movement patterns to attain remaining goals. Progress toward goals / Updated goals: 
· Long Term Goals: To be accomplished in  12  treatments: 1. Patient will demo 4+/5 hip ABD strength for decreased gait deviations: progressing 2. Patient will report 75% improvement in overall improvement in functional ADL's for ease with daily activities :Achieved 3. Improve FOTO outcome score to 60/100  in order to indicate a significant improvement in ADL function: slow progression 4. Patient will be independent with long term HEP in order to prepare for DC to home. progressing 5. Pt will be able to amb 500 ft with no antalgic gait pattern in order to perform community amb and return to PLOF. Progressing PLAN 
 []  Upgrade activities as tolerated YES Continue plan of care  
[]  Discharge due to :   
[]  Other:   
 
Therapist: Judy Storey Date: 8/29/2018 Time: 6:38 AM  
 
 
Future Appointments Date Time Provider Alanis Childs 8/29/2018 7:30 AM Sheila Morse PTA [facility]  
8/31/2018 3:00 PM Blu Decker PTA [facility]  
9/4/2018 7:00 AM [address] [place] [address], PT [facility]  
9/7/2018 7:30 AM Lamine James, PT [address] [facility]

## 2018-08-31 ENCOUNTER — HOSPITAL ENCOUNTER (OUTPATIENT)
Dept: PHYSICAL THERAPY | Age: 58
Discharge: HOME | End: 2018-08-31
Payer: COMMERCIAL

## 2018-08-31 PROCEDURE — 97110 THERAPEUTIC EXERCISES: CPT

## 2018-08-31 PROCEDURE — 97140 MANUAL THERAPY 1/> REGIONS: CPT

## 2018-08-31 NOTE — PROGRESS NOTES
PHYSICAL THERAPY - DAILY TREATMENT NOTE Patient Name: Gloria Ferguson        Date: 2018 : 1960   YES Patient  Verified Visit #:     Insurance: Payor: Mateusz Salter / [place] [employer] [place] / Product Type: PPO / In time: 255 Out time: 335 Total Treatment Time: 40 Medicare Time Tracking (below) Total Timed Codes (min):  40 1:1 Treatment Time:    
TREATMENT AREA =  Left hip pain [M25.552] SUBJECTIVE Pain Level (on 0 to 10 scale):  1  / 10 Medication Changes/New allergies or changes in medical history, any new surgeries or procedures? NO    If yes, update Summary List  
Subjective Functional Status/Changes:  []  No changes reported Pt reports 85% improvement in symptoms since beginning PT. States she is not having nearly as much soreness. Reports recent max pain of 2/10. OBJECTIVE 25 min Therapeutic Exercise:  [x]  See flow sheet Rationale:      increase ROM, increase strength, improve coordination and improve balance to improve the patients ability to perform pain free ADLs. 15 Min Manual Therapy: STM/DTM and TPR (L) glute med, piriformis. Rationale:      decrease pain, increase ROM, increase tissue extensibility and decrease trigger points to improve patient's ability to perform pain free ADLs. min Patient Education:  YES  Reviewed HEP []  Progressed/Changed HEP based on: Other Objective/Functional Measures: Therex per flow sheet. Post Treatment Pain Level (on 0 to 10) scale:   1  / 10 ASSESSMENT Assessment/Changes in Function: No exacerbation of symptoms with today's session. []  See Progress Note/Recertification Patient will continue to benefit from skilled PT services to modify and progress therapeutic interventions, address functional mobility deficits, address ROM deficits, address strength deficits, analyze and address soft tissue restrictions, analyze and cue movement patterns, analyze and modify body mechanics/ergonomics, assess and modify postural abnormalities and address imbalance/dizziness to attain remaining goals. Progress toward goals / Updated goals: LTG #2 met. PLAN [x]  Upgrade activities as tolerated YES Continue plan of care  
[]  Discharge due to :   
[]  Other:   
 
Therapist: Jorge Lara PTA Date: 8/31/2018 Time: 3:40 PM  
 
Future Appointments Date Time Provider Alanis Childs 9/4/2018 7:00 AM Roseann James, PT [facility]  
9/7/2018 7:30 AM Digna Bowden, PT [address] [facility]

## 2018-09-05 ENCOUNTER — OFFICE VISIT (OUTPATIENT)
Dept: FAMILY MEDICINE CLINIC | Age: 58
End: 2018-09-05

## 2018-09-05 VITALS
RESPIRATION RATE: 17 BRPM | TEMPERATURE: 96.7 F | WEIGHT: 227.2 LBS | BODY MASS INDEX: 38.79 KG/M2 | HEART RATE: 78 BPM | DIASTOLIC BLOOD PRESSURE: 72 MMHG | HEIGHT: 64 IN | OXYGEN SATURATION: 97 % | SYSTOLIC BLOOD PRESSURE: 132 MMHG

## 2018-09-05 DIAGNOSIS — J01.00 ACUTE MAXILLARY SINUSITIS, RECURRENCE NOT SPECIFIED: Primary | ICD-10-CM

## 2018-09-05 DIAGNOSIS — J45.909 UNCOMPLICATED ASTHMA, UNSPECIFIED ASTHMA SEVERITY, UNSPECIFIED WHETHER PERSISTENT: ICD-10-CM

## 2018-09-05 DIAGNOSIS — J20.9 ACUTE BRONCHITIS, UNSPECIFIED ORGANISM: ICD-10-CM

## 2018-09-05 RX ORDER — ALBUTEROL SULFATE 0.83 MG/ML
2.5 SOLUTION RESPIRATORY (INHALATION)
Qty: 24 EACH | Refills: 1 | Status: SHIPPED | OUTPATIENT
Start: 2018-09-05

## 2018-09-05 RX ORDER — AMOXICILLIN AND CLAVULANATE POTASSIUM 562.5; 437.5; 62.5 MG/1; MG/1; MG/1
1 TABLET, FILM COATED, EXTENDED RELEASE ORAL 2 TIMES DAILY
Qty: 20 TAB | Refills: 0 | Status: CANCELLED | OUTPATIENT
Start: 2018-09-05

## 2018-09-05 RX ORDER — ALBUTEROL SULFATE 90 UG/1
2 AEROSOL, METERED RESPIRATORY (INHALATION)
Qty: 1 INHALER | Refills: 3 | Status: SHIPPED | OUTPATIENT
Start: 2018-09-05

## 2018-09-05 RX ORDER — LEVOFLOXACIN 500 MG/1
500 TABLET, FILM COATED ORAL DAILY
Qty: 7 TAB | Refills: 0 | Status: SHIPPED | OUTPATIENT
Start: 2018-09-05 | End: 2018-09-15

## 2018-09-05 NOTE — PROGRESS NOTES
Anali Don is a 60 y.o.  female and presents with a few days of mucopurulent nasal drainage and cough simultaneously. She is a chronic smoker. No significant chronic URI or LRIs. Chief Complaint Patient presents with  Nasal Congestion  Cough Subjective: Additional Concerns: none Patient Active Problem List  
 Diagnosis Date Noted  Severe obesity (BMI 35.0-39. 9) with comorbidity ([facility] 05/18/2018  Type 2 diabetes mellitus with nephropathy ([facility] 02/06/2018  Type 2 diabetes mellitus with microalbuminuria, without long-term current use of insulin ([facility] 06/09/2017  Statin intolerance 01/25/2017  Osteopenia 06/07/2016  Seasonal affective disorder ([facility] 05/13/2015  Obesity (BMI 30-39.9) 03/31/2015  Allergy-induced asthma 03/31/2015  Tobacco use 03/31/2015  Vitamin D deficiency 03/31/2015  Sciatic pain 03/31/2015  Mixed hypercholesterolemia and hypertriglyceridemia 03/31/2015 Current Outpatient Prescriptions Medication Sig Dispense Refill  levoFLOXacin (LEVAQUIN) 500 mg tablet Take 1 Tab by mouth daily for 10 days. 7 Tab 0  
 albuterol (PROVENTIL HFA, VENTOLIN HFA, PROAIR HFA) 90 mcg/actuation inhaler Take 2 Puffs by inhalation every six (6) hours as needed for Wheezing. 1 Inhaler 3  
 albuterol (PROVENTIL VENTOLIN) 2.5 mg /3 mL (0.083 %) nebulizer solution 3 mL by Nebulization route every four (4) hours as needed for Wheezing. 24 Each 1  
 fenofibrate nanocrystallized (TRICOR) 48 mg tablet Take 1 Tab by mouth daily. 90 Tab 0  
 budesonide-formoterol (SYMBICORT) 80-4.5 mcg/actuation HFAA Take 2 Puffs by inhalation two (2) times a day for 90 days. 3 Inhaler 1  
 lisinopril (PRINIVIL, ZESTRIL) 5 mg tablet Take 1 Tab by mouth daily. Indications: Diabetic Nephropathy, hypertension 90 Tab 1  
 metFORMIN ER (GLUCOPHAGE XR) 500 mg tablet Take 1 Tab by mouth daily.  Indications: type 2 diabetes mellitus 90 Tab 1  
  tiZANidine (ZANAFLEX) 4 mg tablet One tab twice daily prn muscle tension 14 Tab 0  
 escitalopram oxalate (LEXAPRO) 10 mg tablet Take 10 mg by mouth daily. Allergies Allergen Reactions  Animal Dander Other (comments) asthma  Keflex [Cephalexin] Hives  Mold Extracts Other (comments) asthma  Other Plant, Animal, Environmental Other (comments)  
  blisters  Percocet [Oxycodone-Acetaminophen] Hives  Pollen Extracts Other (comments) asthma Past Medical History:  
Diagnosis Date  Asthma  Diabetes ([facility]  Hypercholesterolemia Past Surgical History:  
Procedure Laterality Date  HX BLADDER SUSPENSION    
 HX CHOLECYSTECTOMY  HX HERNIA REPAIR    
 incisional  
 HX HYSTERECTOMY  HX SVT ABLATION  2006  HX TONSILLECTOMY Family History Problem Relation Age of Onset  Lung Disease Mother  Cancer Mother   
  lung  Alcohol abuse Father  Diabetes Father  Cancer Maternal Aunt 80  
  breast  
 
Social History Substance Use Topics  Smoking status: Heavy Tobacco Smoker Packs/day: 1.00  Smokeless tobacco: Never Used  Alcohol use No  
 
ROS General: negative for - chills, fatigue, fever, weight change ENT: negative for - headaches, hearing change, nasal congestion, oral lesions, sneezing or sore throat Heme/ Lymph: negative for - bleeding problems, bruising, pallor or swollen lymph nodes Endo: negative for - hot flashes, polydipsia/polyuria or temperature intolerance Resp: negative for - cough, shortness of breath or wheezing CV: negative for - chest pain, edema or palpitations GI: negative for - abdominal pain, change in bowel habits, constipation, diarrhea or nausea/vomiting : negative for - dysuria, hematuria, incontinence, pelvic pain or vulvar/vaginal symptoms MSK: negative for - joint pain, joint swelling or muscle pain Neuro: negative for - confusion, headaches, seizures or weakness Derm: negative for - dry skin, hair changes, rash or skin lesion changes Objective: 
Vitals:  
 09/05/18 8411 BP: 132/72 Pulse: 78 Resp: 17 Temp: 96.7 °F (35.9 °C) TempSrc: Oral  
SpO2: 97% Weight: 227 lb 3.2 oz (103.1 kg) Height: 5' 4\" (1.626 m) PainSc:   1 PE 
 
alert, well appearing, and in no distress, oriented to person, place, and time and overweight General appearance - alert, well appearing, and in no distress, oriented to person, place, and time and normal appearing weight Mental status - alert, oriented to person, place, and time, normal mood, behavior, speech, dress, motor activity, and thought processes Sinuses - maxillary non tender. Frontal tender Eyes - pupils equal and reactive, extraocular eye movements intact Ears - bilateral TM's and external ear canals normal, hearing grossly normal bilaterally Mouth - mucous membranes moist, pharynx normal without lesions Neck - supple, no significant adenopathy Chest - clear to auscultation, no wheezes, rales or rhonchi, symmetric air entry Heart - normal rate, regular rhythm, normal S1, S2, no murmurs, rubs, clicks or gallops LABS Orders Only on 07/06/2018 Component Date Value Ref Range Status  Triglyceride 07/06/2018 333* 40 - 149 mg/dL Final  
 HDL Cholesterol 07/06/2018 31* 40 - 59 mg/dL Final  
 Cholesterol, total 07/06/2018 191  110 - 200 mg/dL Final  
 CHOLESTEROL/HDL 07/06/2018 6.2  0.0 - 5.0 Final  
 LDL, calculated 07/06/2018 93  50 - 99 mg/dL Final  
 VLDL, calculated 07/06/2018 67* 8 - 30 mg/dL Final  
 Comment: Test includes cholesterol, HDL cholesterol, triglycerides and LDL. Cholesterol Recommended NCEP guidelines in mg/dL: 
Less than 200            Desirable 200 - 239                Borderline High Greater than or  = 240   High Please Note: Total Chol/HDL Ratio Men     Women 1/2 Avg. Risk    3.4     3.3 Avg. Risk    5.0     4.4 
2X  Avg. Risk    9.6     7.1 3X  Avg. Risk   23.4    11.0 Orders Only on 05/16/2018 Component Date Value Ref Range Status  Triglyceride 05/17/2018 303* 40 - 149 mg/dL Final  
 HDL Cholesterol 05/17/2018 32* 40 - 59 mg/dL Final  
 Cholesterol, total 05/17/2018 187  110 - 200 mg/dL Final  
 CHOLESTEROL/HDL 05/17/2018 5.8  0.0 - 5.0 Final  
 LDL, calculated 05/17/2018 94  50 - 99 mg/dL Final  
 VLDL, calculated 05/17/2018 61* 8 - 30 mg/dL Final  
 Comment: Test includes cholesterol, HDL cholesterol, triglycerides and LDL. Cholesterol Recommended NCEP guidelines in mg/dL: 
Less than 200            Desirable 200 - 239                Borderline High Greater than or  = 240   High Please Note: Total Chol/HDL Ratio Men     Women 1/2 Avg. Risk    3.4     3.3 Avg. Risk    5.0     4.4 
2X  Avg. Risk    9.6     7.1 3X  Avg. Risk   23.4    11.0  Glucose 05/17/2018 135* 70 - 99 mg/dL Final  
 BUN 05/17/2018 9  6 - 22 mg/dL Final  
 Creatinine 05/17/2018 0.5  0.5 - 1.2 mg/dL Final  
 Sodium 05/17/2018 138  133 - 145 mmol/L Final  
 Potassium 05/17/2018 4.5  3.5 - 5.5 mmol/L Final  
 Chloride 05/17/2018 95* 98 - 110 mmol/L Final  
 CO2 05/17/2018 28  20 - 32 mmol/L Final  
 AST (SGOT) 05/17/2018 34  10 - 37 U/L Final  
 ALT (SGPT) 05/17/2018 36  5 - 40 U/L Final  
 Alk. phosphatase 05/17/2018 93  25 - 115 U/L Final  
 Bilirubin, total 05/17/2018 0.3  0.2 - 1.2 mg/dL Final  
 Calcium 05/17/2018 9.0  8.4 - 10.5 mg/dL Final  
 Protein, total 05/17/2018 6.6  6.4 - 8.3 g/dL Final  
 Albumin 05/17/2018 4.4  3.5 - 5.0 g/dL Final  
 A-G Ratio 05/17/2018 2.0  1.1 - 2.6 ratio Final  
 Globulin 05/17/2018 2.2  2.0 - 4.0 g/dL Final  
 Anion gap 05/17/2018 15.0  mmol/L Final  
 Comment: Test includes Albumin, Alkaline Phosphatase, ALT, AST, BUN, Calcium, CO2, Chloride, Creatinine, Glucose, Potassium, Sodium, Total Bilirubin and Total 
Protein. Estimated GFR results are reported in mL/min/1.73 sq.m. by the MDRD equation. This eGFR is validated for stable chronic renal failure patients. This  
equation 
is unreliable in acute illness or patients with normal renal function.  GFRAA 05/17/2018 >60.0  >60.0 Final  
 GFRNA 05/17/2018 >60.0  >60.0 Final  
 Hemoglobin A1c 05/17/2018 6.4* 4.8 - 5.9 % Final  
 AVG GLU 05/17/2018 138* 91 - 123 mg/dL Final  
 
 
TESTS Results for orders placed or performed in visit on 07/06/18 LIPID PANEL Result Value Ref Range Triglyceride 333 (H) 40 - 149 mg/dL HDL Cholesterol 31 (L) 40 - 59 mg/dL Cholesterol, total 191 110 - 200 mg/dL CHOLESTEROL/HDL 6.2 0.0 - 5.0 LDL, calculated 93 50 - 99 mg/dL VLDL, calculated 67 (H) 8 - 30 mg/dL Assessment/Plan: 1. Acute maxillary sinusitis, recurrence not specified - levoFLOXacin (LEVAQUIN) 500 mg tablet; Take 1 Tab by mouth daily for 10 days. Dispense: 7 Tab; Refill: 0 
 
2. Uncomplicated asthma, unspecified asthma severity, unspecified whether persistent 
- albuterol (PROVENTIL HFA, VENTOLIN HFA, PROAIR HFA) 90 mcg/actuation inhaler; Take 2 Puffs by inhalation every six (6) hours as needed for Wheezing. Dispense: 1 Inhaler; Refill: 3 
- albuterol (PROVENTIL VENTOLIN) 2.5 mg /3 mL (0.083 %) nebulizer solution; 3 mL by Nebulization route every four (4) hours as needed for Wheezing. Dispense: 24 Each; Refill: 1 3. Acute bronchitis, unspecified organism 
- levoFLOXacin (LEVAQUIN) 500 mg tablet; Take 1 Tab by mouth daily for 10 days. Dispense: 7 Tab; Refill: 0 
- albuterol (PROVENTIL HFA, VENTOLIN HFA, PROAIR HFA) 90 mcg/actuation inhaler; Take 2 Puffs by inhalation every six (6) hours as needed for Wheezing. Dispense: 1 Inhaler; Refill: 3 
- albuterol (PROVENTIL VENTOLIN) 2.5 mg /3 mL (0.083 %) nebulizer solution; 3 mL by Nebulization route every four (4) hours as needed for Wheezing. Dispense: 24 Each; Refill: 1 Lab review: orders written for new lab studies as appropriate; see orders. I have discussed the diagnosis with the patient and the intended plan as seen in the above orders. The patient has received an after-visit summary and questions were answered concerning future plans. I have discussed medication side effects and warnings with the patient as well. I have reviewed the plan of care with the patient, accepted their input and they are in agreement with the treatment goals. F/U as needed.   
 
Deronda Kehr, MD

## 2018-09-05 NOTE — PATIENT INSTRUCTIONS
Bronchitis: Care Instructions Your Care Instructions Bronchitis is inflammation of the bronchial tubes, which carry air to the lungs. The tubes swell and produce mucus, or phlegm. The mucus and inflamed bronchial tubes make you cough. You may have trouble breathing. Most cases of bronchitis are caused by viruses like those that cause colds. Antibiotics usually do not help and they may be harmful. Bronchitis usually develops rapidly and lasts about 2 to 3 weeks in otherwise healthy people. Follow-up care is a key part of your treatment and safety. Be sure to make and go to all appointments, and call your doctor if you are having problems. It's also a good idea to know your test results and keep a list of the medicines you take. How can you care for yourself at home? · Take all medicines exactly as prescribed. Call your doctor if you think you are having a problem with your medicine. · Get some extra rest. 
· Take an over-the-counter pain medicine, such as acetaminophen (Tylenol), ibuprofen (Advil, Motrin), or naproxen (Aleve) to reduce fever and relieve body aches. Read and follow all instructions on the label. · Do not take two or more pain medicines at the same time unless the doctor told you to. Many pain medicines have acetaminophen, which is Tylenol. Too much acetaminophen (Tylenol) can be harmful. · Take an over-the-counter cough medicine that contains dextromethorphan to help quiet a dry, hacking cough so that you can sleep. Avoid cough medicines that have more than one active ingredient. Read and follow all instructions on the label. · Breathe moist air from a humidifier, hot shower, or sink filled with hot water. The heat and moisture will thin mucus so you can cough it out. · Do not smoke. Smoking can make bronchitis worse. If you need help quitting, talk to your doctor about stop-smoking programs and medicines. These can increase your chances of quitting for good. When should you call for help? Call 911 anytime you think you may need emergency care. For example, call if: 
  · You have severe trouble breathing.  
 Call your doctor now or seek immediate medical care if: 
  · You have new or worse trouble breathing.  
  · You cough up dark brown or bloody mucus (sputum).  
  · You have a new or higher fever.  
  · You have a new rash.  
 Watch closely for changes in your health, and be sure to contact your doctor if: 
  · You cough more deeply or more often, especially if you notice more mucus or a change in the color of your mucus.  
  · You are not getting better as expected. Where can you learn more? Go to http://malick-elizabeth.info/. Enter H333 in the search box to learn more about \"Bronchitis: Care Instructions. \" Current as of: December 6, 2017 Content Version: 11.7 © 6289-3250 [employer]. Care instructions adapted under license by [employer] (which disclaims liability or warranty for this information). If you have questions about a medical condition or this instruction, always ask your healthcare professional. [address] any warranty or liability for your use of this information. Asthma in Adults: Care Instructions Your Care Instructions During an asthma attack, your airways swell and narrow as a reaction to certain things (triggers). This makes it hard to breathe. You may be able to prevent asthma attacks if you avoid the things that set off your asthma symptoms. Keeping your asthma under control and treating symptoms before they get bad can help you avoid severe attacks. If you can control your asthma, you may be able to do all of your normal daily activities. You may also avoid asthma attacks and trips to the hospital. 
Follow-up care is a key part of your treatment and safety.  Be sure to make and go to all appointments, and call your doctor if you are having problems. It's also a good idea to know your test results and keep a list of the medicines you take. How can you care for yourself at home? · Follow your asthma action plan so you can manage your symptoms at home. An asthma action plan will help you prevent and control airway reactions and will tell you what to do during an asthma attack. If you do not have an asthma action plan, work with your doctor to build one. · Take your asthma medicine exactly as prescribed. Medicine plays an important role in controlling asthma. Talk to your doctor right away if you have any questions about what to take and how to take it. ¨ Use your quick-relief medicine when you have symptoms of an attack. Quick-relief medicine often is an albuterol inhaler. Some people need to use quick-relief medicine before they exercise. ¨ Take your controller medicine every day, not just when you have symptoms. Controller medicine is usually an inhaled corticosteroid. The goal is to prevent problems before they occur. Do not use your controller medicine to try to treat an attack that has already started. It does not work fast enough to help. ¨ If your doctor prescribed corticosteroid pills to use during an attack, take them as directed. They may take hours to work, but they may shorten the attack and help you breathe better. ¨ Keep your quick-relief medicine with you at all times. · Talk to your doctor before using other medicines. Some medicines, such as aspirin, can cause asthma attacks in some people. · Check yourself for asthma symptoms to know which step to follow in your action plan. Watch for things like being short of breath, having chest tightness, coughing, and wheezing. Also notice if symptoms wake you up at night or if you get tired quickly when you exercise. · If you have a peak flow meter, use it to check how well you are breathing.  This can help you predict when an asthma attack is going to occur. Then you can take medicine to prevent the asthma attack or make it less severe. · See your doctor regularly. These visits will help you learn more about asthma and what you can do to control it. Your doctor will monitor your treatment to make sure the medicine is helping you. · Keep track of your asthma attacks and your treatment. After you have had an attack, write down what triggered it, what helped end it, and any concerns you have about your asthma action plan. Take your diary when you see your doctor. You can then review your asthma action plan and decide if it is working. · Do not smoke or allow others to smoke around you. Avoid smoky places. Smoking makes asthma worse. If you need help quitting, talk to your doctor about stop-smoking programs and medicines. These can increase your chances of quitting for good. · Learn what triggers an asthma attack for you, and avoid the triggers when you can. Common triggers include colds, smoke, air pollution, dust, pollen, mold, pets, cockroaches, stress, and cold air. · Avoid colds and the flu. Get a pneumococcal vaccine shot. If you have had one before, ask your doctor whether you need a second dose. Get a flu vaccine every fall. If you must be around people with colds or the flu, wash your hands often. When should you call for help? Call 911 anytime you think you may need emergency care. For example, call if: 
  · You have severe trouble breathing.  
 Call your doctor now or seek immediate medical care if: 
  · Your symptoms do not get better after you have followed your asthma action plan.  
  · You cough up yellow, dark brown, or bloody mucus (sputum).  
 Watch closely for changes in your health, and be sure to contact your doctor if: 
  · Your coughing and wheezing get worse.  
  · You need to use quick-relief medicine on more than 2 days a week (unless it is just for exercise).  
  · You need help figuring out what is triggering your asthma attacks. Where can you learn more? Go to http://malick-elizabeth.info/. Enter P597 in the search box to learn more about \"Asthma in Adults: Care Instructions. \" Current as of: December 6, 2017 Content Version: 11.7 © 3572-7684 [employer]. Care instructions adapted under license by [employer] (which disclaims liability or warranty for this information). If you have questions about a medical condition or this instruction, always ask your healthcare professional. [place] [address] any warranty or liability for your use of this information.

## 2018-09-05 NOTE — PROGRESS NOTES
Pop Young is a 60 y.o. female presents to office for wet cough and congestion Medication list has been reviewed with Pop Young and updated as of today's date Health Maintenance items with a due date reviewed with patient: 
Health Maintenance Due Topic Date Due  
 Hepatitis C Screening  1960  Pneumococcal 19-64 Medium Risk (1 of 1 - PPSV23) 01/16/1978  BREAST CANCER SCRN MAMMOGRAM  09/26/2016  COLON CANCER SCRN (BARIUM / CT COLO / FLX SIG) Q5  01/01/2017  MICROALBUMIN Q1  06/08/2018  
 FOOT EXAM Q1  06/16/2018  Influenza Age 5 to Adult  08/01/2018

## 2018-09-05 NOTE — MR AVS SNAPSHOT
[address] [place] [id number] 
[phone] Patient: Radha Durant MRN: [id number] IUE:[birth year] Visit Information Date & Time Provider Department Dept. Phone Encounter #  
 9/5/2018  7:30 AM Ilana Gudino MD [facility] [phone] [id number] Upcoming Health Maintenance Date Due Hepatitis C Screening 1960 Pneumococcal 19-64 Medium Risk (1 of 1 - PPSV23) 1/16/1978 BREAST CANCER SCRN MAMMOGRAM 9/26/2016 COLON CANCER SCRN (BARIUM / CT COLO / FLX SIG) Q5 1/1/2017 MICROALBUMIN Q1 6/8/2018 FOOT EXAM Q1 6/16/2018 Influenza Age 5 to Adult 8/1/2018 EYE EXAM RETINAL OR DILATED Q1 11/3/2018 HEMOGLOBIN A1C Q6M 11/17/2018 LIPID PANEL Q1 7/6/2019 DTaP/Tdap/Td series (2 - Td) 12/9/2026 Allergies as of 9/5/2018  Review Complete On: 7/27/2018 By: Nasima Persaud LPN Severity Noted Reaction Type Reactions Animal Dander  12/23/2009    Other (comments) asthma Keflex [Cephalexin]  03/31/2015    Hives Mold Extracts  12/23/2009    Other (comments) asthma Other Plant, Animal, Environmental  12/28/2009    Other (comments)  
 blisters Percocet [Oxycodone-acetaminophen]  03/31/2015    Hives Pollen Extracts  12/23/2009    Other (comments) asthma Current Immunizations  Never Reviewed Name Date Influenza Vaccine 10/2/2015  8:39 AM  
 Influenza Vaccine (Quad) PF 12/9/2016 Tdap 12/9/2016 Not reviewed this visit You Were Diagnosed With   
  
 Codes Comments Acute maxillary sinusitis, recurrence not specified    -  Primary ICD-10-CM: J01.00 ICD-9-CM: 461.0 Uncomplicated asthma, unspecified asthma severity, unspecified whether persistent     ICD-10-CM: J45.909 ICD-9-CM: 493.90 Acute bronchitis, unspecified organism     ICD-10-CM: J20.9 ICD-9-CM: 466.0 Vitals BP Pulse Temp Resp Height(growth percentile) Weight(growth percentile) 132/72 78 96.7 °F (35.9 °C) (Oral) 17 5' 4\" (1.626 m) 227 lb 3.2 oz (103.1 kg) SpO2 BMI OB Status Smoking Status 97% 39 kg/m2 Hysterectomy Heavy Tobacco Smoker BMI and BSA Data Body Mass Index Body Surface Area  
 39 kg/m 2 2.16 m 2 Preferred Pharmacy Pharmacy Name Phone [facility] [phone] Your Updated Medication List  
  
   
This list is accurate as of 9/5/18  7:55 AM.  Always use your most recent med list.  
  
  
  
  
 * albuterol 90 mcg/actuation inhaler Commonly known as:  PROVENTIL HFA, VENTOLIN HFA, PROAIR HFA Take 2 Puffs by inhalation every six (6) hours as needed for Wheezing. * albuterol 2.5 mg /3 mL (0.083 %) nebulizer solution Commonly known as:  PROVENTIL VENTOLIN  
3 mL by Nebulization route every four (4) hours as needed for Wheezing. budesonide-formoterol 80-4.5 mcg/actuation Hfaa Commonly known as:  SYMBICORT Take 2 Puffs by inhalation two (2) times a day for 90 days. escitalopram oxalate 10 mg tablet Commonly known as:  Wilene Heads Take 10 mg by mouth daily. fenofibrate nanocrystallized 48 mg tablet Commonly known as:  [employer] Take 1 Tab by mouth daily. levoFLOXacin 500 mg tablet Commonly known as:  Carl Milian Take 1 Tab by mouth daily for 10 days. lisinopril 5 mg tablet Commonly known as:  Nancy Cuellar Take 1 Tab by mouth daily. Indications: Diabetic Nephropathy, hypertension  
  
 metFORMIN  mg tablet Commonly known as:  GLUCOPHAGE XR Take 1 Tab by mouth daily. Indications: type 2 diabetes mellitus  
  
 tiZANidine 4 mg tablet Commonly known as:  Naz Carpio One tab twice daily prn muscle tension * Notice: This list has 2 medication(s) that are the same as other medications prescribed for you.  Read the directions carefully, and ask your doctor or other care provider to review them with you. Prescriptions Sent to Pharmacy Refills  
 levoFLOXacin (LEVAQUIN) 500 mg tablet 0 Sig: Take 1 Tab by mouth daily for 10 days. Class: Normal  
 Pharmacy: [address] Ph #: [phone] Route: Oral  
 albuterol (PROVENTIL HFA, VENTOLIN HFA, PROAIR HFA) 90 mcg/actuation inhaler 3 Sig: Take 2 Puffs by inhalation every six (6) hours as needed for Wheezing. Class: Normal  
 Pharmacy: [address] Ph #: [phone] Route: Inhalation  
 albuterol (PROVENTIL VENTOLIN) 2.5 mg /3 mL (0.083 %) nebulizer solution 1 Sig: 3 mL by Nebulization route every four (4) hours as needed for Wheezing. Class: Normal  
 Pharmacy: [address] Ph #: [phone] Route: Nebulization Introducing [address]! Dear Aldair Paul: Thank you for requesting a [employer] account. Our records indicate that you already have an active [employer] account. You can access your account anytime at [email or web address] Did you know that you can access your hospital and ER discharge instructions at any time in [employer]? You can also review all of your test results from your hospital stay or ER visit. Additional Information If you have questions, please visit the Frequently Asked Questions section of the [employer] website at [email or web address] Remember, [employer] is NOT to be used for urgent needs. For medical emergencies, dial 911. Now available from your iPhone and Android! Please provide this summary of care documentation to your next provider. Your primary care clinician is listed as Raiza Chan. If you have any questions after today's visit, please call [phone].

## 2018-09-07 ENCOUNTER — APPOINTMENT (OUTPATIENT)
Dept: PHYSICAL THERAPY | Age: 58
End: 2018-09-07

## 2018-09-21 ENCOUNTER — TELEPHONE (OUTPATIENT)
Dept: FAMILY MEDICINE CLINIC | Age: 58
End: 2018-09-21

## 2018-09-21 DIAGNOSIS — R91.8 ABNORMAL CT SCAN, LUNG: Primary | ICD-10-CM

## 2018-09-21 NOTE — TELEPHONE ENCOUNTER
Pt would like a referral for a Pulmonary Specialist regarding a shadow on lower right lung. Xray from this office picked it up a year ago and a CT scan picked it up 6 wks ago.    Pt would like to see [facility]monary [facility] of [address] [place] [address], [address], [facility] , [address] [place] [address], [address] [place] [address] [phone], fax # [phone]

## 2019-02-08 NOTE — PROGRESS NOTES
[address] PHYSICAL THERAPY 
[address] [place] [address] 201,[facility], [address] [facility] - Phone: [phone]  Fax: [phone] DISCHARGE SUMMARY Patient Name: Soo Shea : 1960 Treatment/Medical Diagnosis: Left hip pain [M25.552] [facility] Date of Initial Visit: 2018 Attended Visits: 8 Missed Visits: 1 SUMMARY OF TREATMENT Soo Shea has been seen at our clinic 2-3x/wk for a total of 8 visits. Pt treatment has consisted of aerobic warm-up with stationary bike, therapeutic exercise for hip ROM, LE strengthening, hip/core strengthening, modalities prn and manual therapy (STM/TPR to the L pirifomris, glute med and ITB) CURRENT STATUS Pt has had a good tolerance to physical therapy treatment. Patient stated that she was not having nearly as much soreness as before therapy had begun. Reported recent max pain of 2/10. Patient was able to perform 10 mini squats without hip pain and good form. Patient reported that she was able to ambulate community distances with reduced c/o symptoms. GROC = +4. · Short Term Goals: To be accomplished in  6  treatments: Independent/compliant with long term HEP for ROM, flexibility and strength: Yes Improve active dorsiflexion by 5 degrees to improve/normalize gait cycle: not reassessed. Goal/Measure of Progress Goal Met? 1. Patient will demo 4+/5 hip ABD strength for decreased gait deviations Status at last Eval: 4-/5 L hip Current Status: WFL yes 2. Patient will report 75% improvement in overall improvement in functional ADL's for ease with daily activities Status at last Eval:  Current Status:  
Pt reports 85% improvement in symptoms Yes 3. Improve FOTO outcome score to 60/100  in order to indicate a significant improvement in ADL function.   
Status at last Eval: 54 Current Status: 49 no  
 
 4.  Patient will be independent with long term HEP in order to prepare for DC to home. Status at last Eval:  Current Status:  progressing 5. Pt will be able to amb 500 ft with no antalgic gait pattern in order to perform community amb and return to PLOF. Status at last Eval: unable Current Status: Good reduction of antalgic gait progressing RECOMMENDATIONS Discharge from physical therapy treatment with HEP. Specifics: Patient did not return for further treatment of diagnosis. Unable to formally reassess all goals. Thank you for this referral. 
 
If you have any questions/comments please contact us directly at [phone]. Thank you for allowing us to assist in the care of your patient. LPTA Signature: Adrien Scott, PTA Date: 2/8/2019 PT Signature: Doug Alvarez PT Time: 6:49 AM